# Patient Record
Sex: FEMALE | Race: WHITE | NOT HISPANIC OR LATINO | Employment: UNEMPLOYED | URBAN - NONMETROPOLITAN AREA
[De-identification: names, ages, dates, MRNs, and addresses within clinical notes are randomized per-mention and may not be internally consistent; named-entity substitution may affect disease eponyms.]

---

## 2017-01-20 ENCOUNTER — ROUTINE PRENATAL (OUTPATIENT)
Dept: OBSTETRICS AND GYNECOLOGY | Facility: CLINIC | Age: 32
End: 2017-01-20

## 2017-01-20 ENCOUNTER — HOSPITAL ENCOUNTER (OUTPATIENT)
Dept: OTHER | Facility: HOSPITAL | Age: 32
Discharge: HOME | End: 2017-01-20
Attending: NURSE PRACTITIONER | Admitting: NURSE PRACTITIONER

## 2017-01-20 VITALS — DIASTOLIC BLOOD PRESSURE: 73 MMHG | BODY MASS INDEX: 34.02 KG/M2 | WEIGHT: 214 LBS | SYSTOLIC BLOOD PRESSURE: 110 MMHG

## 2017-01-20 DIAGNOSIS — O26.891 RH NEGATIVE STATUS DURING PREGNANCY IN FIRST TRIMESTER, ANTEPARTUM: ICD-10-CM

## 2017-01-20 DIAGNOSIS — Z3A.11 11 WEEKS GESTATION OF PREGNANCY: Primary | ICD-10-CM

## 2017-01-20 DIAGNOSIS — Z67.91 RH NEGATIVE STATUS DURING PREGNANCY IN FIRST TRIMESTER, ANTEPARTUM: ICD-10-CM

## 2017-01-20 DIAGNOSIS — Z34.81 PRENATAL CARE, SUBSEQUENT PREGNANCY, FIRST TRIMESTER: ICD-10-CM

## 2017-01-20 PROCEDURE — 0502F SUBSEQUENT PRENATAL CARE: CPT | Performed by: ADVANCED PRACTICE MIDWIFE

## 2017-01-20 RX ORDER — FERROUS SULFATE TAB EC 324 MG (65 MG FE EQUIVALENT) 324 (65 FE) MG
324 TABLET DELAYED RESPONSE ORAL
COMMUNITY
End: 2017-05-26

## 2017-01-20 RX ORDER — METRONIDAZOLE 500 MG/1
500 TABLET ORAL 2 TIMES DAILY
Qty: 14 TABLET | Refills: 0 | Status: SHIPPED | OUTPATIENT
Start: 2017-01-20 | End: 2017-05-26

## 2017-01-20 NOTE — MR AVS SNAPSHOT
Landen Guzmán   1/20/2017 11:30 AM   Routine Prenatal    Dept Phone:  [phone]   Encounter #:  [id number]    Provider:  CARMEN العراقي   Department:  [facility]                Your Full Care Plan              Today's Medication Changes          These changes are accurate as of: 1/20/17 11:49 AM.  If you have any questions, ask your nurse or doctor.               New Medication(s)Ordered:     metroNIDAZOLE 500 MG tablet   Commonly known as:  FLAGYL   Take 1 tablet by mouth 2 (Two) Times a Day.         Stop taking medication(s)listed here:     ondansetron 4 MG tablet   Commonly known as:  ZOFRAN                Where to Get Your Medications      These medications were sent to [facility] [id number] [place] [address] - [address] [facility] AT [facility] [address] [place] - [phone]  - [phone]   [address] [facility] [address]     Phone:  [phone]     metroNIDAZOLE 500 MG tablet                  Your Updated Medication List          This list is accurate as of: 1/20/17 11:49 AM.  Always use your most recent med list.                ferrous sulfate 324 (65 FE) MG tablet delayed-release EC tablet       metroNIDAZOLE 500 MG tablet   Commonly known as:  FLAGYL   Take 1 tablet by mouth 2 (Two) Times a Day.       PRENATAL VITAMIN PO               You Were Diagnosed With        Codes Comments    11 weeks gestation of pregnancy    -  Primary ICD-10-CM: Z3A.11  ICD-9-CM: V22.2     Rh negative status during pregnancy in first trimester, antepartum     ICD-10-CM: O09.891  ICD-9-CM: V23.89     Prenatal care, subsequent pregnancy, first trimester     ICD-10-CM: Z34.81  ICD-9-CM: V22.1       Instructions     None    Patient Instructions History      Upcoming Appointments     Visit Type Date Time Department    OB FOLLOWUP 1/20/2017 11:30 AM JUMANA BRASHER    OB FOLLOWUP 2/15/2017 11:00 AM JUMANA Forman Signup     [facility] allows  you to send messages to your doctor, view your test results, renew your prescriptions, schedule appointments, and more. To sign up, go to [facility] and click on the Sign Up Now link in the New User? box. Enter your [email or web address] Activation Code exactly as it appears below along with the last four digits of your Social Security Number and your Date of Birth () to complete the sign-up process. If you do not sign up before the expiration date, you must request a new code.    [email or web address] Activation Code: [email or web address]  Expires: 2/3/2017 11:49 AM    If you have questions, you can email [email or web address] or call [phone] to talk to our [email or web address] staff. Remember, [email or web address] is NOT to be used for urgent needs. For medical emergencies, dial 911.               Other Info from Your Visit           Your Appointments     Feb 15, 2017 11:00 AM CST   OB FOLLOWUP with CARMEN العراقي   [facility] (--)    [address] [place] [address] [address]  [facility] [address] [place] [id number] [phone]              Other Notes About Your Plan     RH negative- Rhogam at 28 weeks  BV- rx sent for flagyl needs to start it on         Allergies     Nickel        Reason for Visit     Routine Prenatal Visit dating ultrasound today       Vital Signs     Blood Pressure Weight Last Menstrual Period Body Mass Index Smoking Status       110/73 214 lb (97.1 kg) 2016 (Approximate) 34.02 kg/m2 Never Smoker       Problems and Diagnoses Noted     Rh negative status during pregnancy in first trimester, antepartum    11 weeks gestation of pregnancy    -  Primary    Prenatal care

## 2017-01-20 NOTE — PROGRESS NOTES
WILLIS appt.    ROS: -N/V,-HA,-SOb. RX sent for flagyl, patient educated on starting it 1/27/17 for BV. Reviewed US and labs   Hx reviewed see HX tabs

## 2017-02-20 ENCOUNTER — ROUTINE PRENATAL (OUTPATIENT)
Dept: OBSTETRICS AND GYNECOLOGY | Facility: CLINIC | Age: 32
End: 2017-02-20

## 2017-02-20 VITALS — DIASTOLIC BLOOD PRESSURE: 67 MMHG | WEIGHT: 217 LBS | SYSTOLIC BLOOD PRESSURE: 102 MMHG | BODY MASS INDEX: 34.5 KG/M2

## 2017-02-20 DIAGNOSIS — Z13.89 ENCOUNTER FOR ROUTINE SCREENING FOR MALFORMATION USING ULTRASONICS: ICD-10-CM

## 2017-02-20 DIAGNOSIS — Z34.82 ENCOUNTER FOR SUPERVISION OF OTHER NORMAL PREGNANCY IN SECOND TRIMESTER: Primary | ICD-10-CM

## 2017-02-20 DIAGNOSIS — Z3A.15 15 WEEKS GESTATION OF PREGNANCY: ICD-10-CM

## 2017-02-20 PROBLEM — Z34.92 ENCOUNTER FOR SUPERVISION OF NORMAL PREGNANCY IN SECOND TRIMESTER: Status: ACTIVE | Noted: 2017-02-20

## 2017-02-20 PROCEDURE — 0502F SUBSEQUENT PRENATAL CARE: CPT | Performed by: OBSTETRICS & GYNECOLOGY

## 2017-02-20 NOTE — PROGRESS NOTES
CC: WILLIS visit    HPI:  No complaints or concerns.  Went to a independent sonogram place, found out that she is having a boy.  No vb.  No cramping.  Has not felt movement yet.     PE - see vitals  BSS - active fetus,     A/P: 30 yo  @ 15w4d by LMP c/w 11w sono presents for WILLIS visit.   1. Continue routine prenatal care  - continue PNV  - PTL precautions  - Rh negative, will need Rhogam at 28 weejs  - declined flu vaccine and quad screen today  - RTC in 3 weeks, will do anatomy scan at that time.     Becky MORALES Friday

## 2017-03-13 ENCOUNTER — ROUTINE PRENATAL (OUTPATIENT)
Dept: OBSTETRICS AND GYNECOLOGY | Facility: CLINIC | Age: 32
End: 2017-03-13

## 2017-03-13 VITALS — BODY MASS INDEX: 34.5 KG/M2 | SYSTOLIC BLOOD PRESSURE: 108 MMHG | WEIGHT: 217 LBS | DIASTOLIC BLOOD PRESSURE: 72 MMHG

## 2017-03-13 DIAGNOSIS — Z67.91 RH NEGATIVE STATUS DURING PREGNANCY IN FIRST TRIMESTER, ANTEPARTUM: ICD-10-CM

## 2017-03-13 DIAGNOSIS — O26.891 RH NEGATIVE STATUS DURING PREGNANCY IN FIRST TRIMESTER, ANTEPARTUM: ICD-10-CM

## 2017-03-13 DIAGNOSIS — Z3A.18 18 WEEKS GESTATION OF PREGNANCY: ICD-10-CM

## 2017-03-13 DIAGNOSIS — Z34.82 ENCOUNTER FOR SUPERVISION OF OTHER NORMAL PREGNANCY IN SECOND TRIMESTER: Primary | ICD-10-CM

## 2017-03-13 PROCEDURE — 0502F SUBSEQUENT PRENATAL CARE: CPT | Performed by: OBSTETRICS & GYNECOLOGY

## 2017-03-13 NOTE — PROGRESS NOTES
CC: WILLIS visit    HPI:  No complaints or concerns.  Nausea completely resolved.  No vb.  No cramping. Starting to feel movements.     PE - see vitals  Anatomy scan - normal male anatomy, EFW 247g, cephalic, posterior placenta, ELLY 11.2; CL 3.16    A/P: 30 yo  @ 18w4d by LMP c/w 11w sujatha presents for WILLIS visit.   1. Continue routine prenatal care  - Continue PNV  - PTL precautions  - Rh negative, will need Rhogam at 28 weejs  - again, declined flu vaccine and quad screen today  - RTC in 4 weeks     Becky P. Friday

## 2017-04-14 ENCOUNTER — ROUTINE PRENATAL (OUTPATIENT)
Dept: OBSTETRICS AND GYNECOLOGY | Facility: CLINIC | Age: 32
End: 2017-04-14

## 2017-04-14 VITALS — SYSTOLIC BLOOD PRESSURE: 112 MMHG | WEIGHT: 217 LBS | DIASTOLIC BLOOD PRESSURE: 63 MMHG | BODY MASS INDEX: 34.5 KG/M2

## 2017-04-14 DIAGNOSIS — Z67.91 RH NEGATIVE STATUS DURING PREGNANCY IN FIRST TRIMESTER, ANTEPARTUM: ICD-10-CM

## 2017-04-14 DIAGNOSIS — O26.891 RH NEGATIVE STATUS DURING PREGNANCY IN FIRST TRIMESTER, ANTEPARTUM: ICD-10-CM

## 2017-04-14 DIAGNOSIS — Z3A.23 23 WEEKS GESTATION OF PREGNANCY: ICD-10-CM

## 2017-04-14 DIAGNOSIS — Z34.82 ENCOUNTER FOR SUPERVISION OF OTHER NORMAL PREGNANCY IN SECOND TRIMESTER: Primary | ICD-10-CM

## 2017-04-14 PROCEDURE — 0502F SUBSEQUENT PRENATAL CARE: CPT | Performed by: OBSTETRICS & GYNECOLOGY

## 2017-05-01 ENCOUNTER — TELEPHONE (OUTPATIENT)
Dept: OBSTETRICS AND GYNECOLOGY | Facility: CLINIC | Age: 32
End: 2017-05-01

## 2017-05-26 ENCOUNTER — LAB (OUTPATIENT)
Dept: LAB | Facility: HOSPITAL | Age: 32
End: 2017-05-26

## 2017-05-26 ENCOUNTER — ROUTINE PRENATAL (OUTPATIENT)
Dept: OBSTETRICS AND GYNECOLOGY | Facility: CLINIC | Age: 32
End: 2017-05-26

## 2017-05-26 VITALS — WEIGHT: 226 LBS | BODY MASS INDEX: 35.93 KG/M2 | DIASTOLIC BLOOD PRESSURE: 62 MMHG | SYSTOLIC BLOOD PRESSURE: 89 MMHG

## 2017-05-26 DIAGNOSIS — Z67.91 RH NEGATIVE STATUS DURING PREGNANCY IN FIRST TRIMESTER, ANTEPARTUM: ICD-10-CM

## 2017-05-26 DIAGNOSIS — Z3A.29 29 WEEKS GESTATION OF PREGNANCY: ICD-10-CM

## 2017-05-26 DIAGNOSIS — O26.891 RH NEGATIVE STATUS DURING PREGNANCY IN FIRST TRIMESTER, ANTEPARTUM: ICD-10-CM

## 2017-05-26 DIAGNOSIS — Z34.83 ENCOUNTER FOR SUPERVISION OF OTHER NORMAL PREGNANCY IN THIRD TRIMESTER: Primary | ICD-10-CM

## 2017-05-26 LAB
BASOPHILS # BLD AUTO: 0.02 10*3/MM3 (ref 0–0.2)
BASOPHILS NFR BLD AUTO: 0.2 % (ref 0–2)
DEPRECATED RDW RBC AUTO: 42.4 FL (ref 36.4–46.3)
EOSINOPHIL # BLD AUTO: 0.05 10*3/MM3 (ref 0–0.7)
EOSINOPHIL NFR BLD AUTO: 0.6 % (ref 0–7)
ERYTHROCYTE [DISTWIDTH] IN BLOOD BY AUTOMATED COUNT: 13.5 % (ref 11.5–14.5)
GLUCOSE 1H P 100 G GLC PO SERPL-MCNC: 105 MG/DL (ref 60–140)
HCT VFR BLD AUTO: 32 % (ref 35–45)
HGB BLD-MCNC: 10.7 G/DL (ref 12–15.5)
IMM GRANULOCYTES # BLD: 0.06 10*3/MM3 (ref 0–0.02)
IMM GRANULOCYTES NFR BLD: 0.7 % (ref 0–0.5)
LYMPHOCYTES # BLD AUTO: 2.07 10*3/MM3 (ref 0.6–4.2)
LYMPHOCYTES NFR BLD AUTO: 24.3 % (ref 10–50)
MCH RBC QN AUTO: 29.1 PG (ref 26.5–34)
MCHC RBC AUTO-ENTMCNC: 33.4 G/DL (ref 31.4–36)
MCV RBC AUTO: 87 FL (ref 80–98)
MONOCYTES # BLD AUTO: 0.67 10*3/MM3 (ref 0–0.9)
MONOCYTES NFR BLD AUTO: 7.9 % (ref 0–12)
NEUTROPHILS # BLD AUTO: 5.64 10*3/MM3 (ref 2–8.6)
NEUTROPHILS NFR BLD AUTO: 66.3 % (ref 37–80)
PLATELET # BLD AUTO: 190 10*3/MM3 (ref 150–450)
PMV BLD AUTO: 11.2 FL (ref 8–12)
RBC # BLD AUTO: 3.68 10*6/MM3 (ref 3.77–5.16)
WBC NRBC COR # BLD: 8.51 10*3/MM3 (ref 3.2–9.8)

## 2017-05-26 PROCEDURE — 96372 THER/PROPH/DIAG INJ SC/IM: CPT | Performed by: OBSTETRICS & GYNECOLOGY

## 2017-05-26 PROCEDURE — 0502F SUBSEQUENT PRENATAL CARE: CPT | Performed by: OBSTETRICS & GYNECOLOGY

## 2017-05-26 PROCEDURE — 82950 GLUCOSE TEST: CPT | Performed by: OBSTETRICS & GYNECOLOGY

## 2017-05-26 PROCEDURE — 85025 COMPLETE CBC W/AUTO DIFF WBC: CPT | Performed by: OBSTETRICS & GYNECOLOGY

## 2017-05-26 PROCEDURE — 36415 COLL VENOUS BLD VENIPUNCTURE: CPT | Performed by: OBSTETRICS & GYNECOLOGY

## 2017-06-13 ENCOUNTER — ROUTINE PRENATAL (OUTPATIENT)
Dept: OBSTETRICS AND GYNECOLOGY | Facility: CLINIC | Age: 32
End: 2017-06-13

## 2017-06-13 VITALS — SYSTOLIC BLOOD PRESSURE: 98 MMHG | WEIGHT: 229 LBS | BODY MASS INDEX: 36.41 KG/M2 | DIASTOLIC BLOOD PRESSURE: 59 MMHG

## 2017-06-13 DIAGNOSIS — Z67.91 RH NEGATIVE STATUS DURING PREGNANCY IN FIRST TRIMESTER, ANTEPARTUM: ICD-10-CM

## 2017-06-13 DIAGNOSIS — Z3A.31 31 WEEKS GESTATION OF PREGNANCY: ICD-10-CM

## 2017-06-13 DIAGNOSIS — O26.891 RH NEGATIVE STATUS DURING PREGNANCY IN FIRST TRIMESTER, ANTEPARTUM: ICD-10-CM

## 2017-06-13 DIAGNOSIS — Z34.83 ENCOUNTER FOR SUPERVISION OF OTHER NORMAL PREGNANCY IN THIRD TRIMESTER: Primary | ICD-10-CM

## 2017-06-13 PROCEDURE — 0502F SUBSEQUENT PRENATAL CARE: CPT | Performed by: OBSTETRICS & GYNECOLOGY

## 2017-06-13 NOTE — PROGRESS NOTES
CC: WILLIS visit    HPI: Doing well.  No LOF or vb.  +FM.  No cramping or ctx.      PE - see vitals    A/P: 30 yo  @ 31w5d by LMP c/w 11w sujatha presents for WILLIS visit.   1. Continue routine prenatal care  - Continue PNV   - PTL precautions  - Rh negative, s/p Rhogam on ; 1hr wnl  - Declined flu vaccine and quad screen  - RTC in 2 weeks      Becky Schwartz

## 2017-06-19 ENCOUNTER — APPOINTMENT (OUTPATIENT)
Dept: GENERAL RADIOLOGY | Facility: HOSPITAL | Age: 32
End: 2017-06-19

## 2017-06-19 ENCOUNTER — APPOINTMENT (OUTPATIENT)
Dept: ULTRASOUND IMAGING | Facility: HOSPITAL | Age: 32
End: 2017-06-19

## 2017-06-19 ENCOUNTER — HOSPITAL ENCOUNTER (EMERGENCY)
Facility: HOSPITAL | Age: 32
Discharge: HOME | End: 2017-06-19
Attending: EMERGENCY MEDICINE | Admitting: EMERGENCY MEDICINE

## 2017-06-19 VITALS
HEIGHT: 66 IN | OXYGEN SATURATION: 95 % | DIASTOLIC BLOOD PRESSURE: 60 MMHG | TEMPERATURE: 97.8 F | RESPIRATION RATE: 18 BRPM | HEART RATE: 90 BPM | SYSTOLIC BLOOD PRESSURE: 116 MMHG

## 2017-06-19 DIAGNOSIS — E87.6 HYPOKALEMIA: ICD-10-CM

## 2017-06-19 DIAGNOSIS — F45.8 HYPERVENTILATION SYNDROME: ICD-10-CM

## 2017-06-19 DIAGNOSIS — R06.00 DYSPNEA, UNSPECIFIED TYPE: Primary | ICD-10-CM

## 2017-06-19 LAB
ALBUMIN SERPL-MCNC: 3.6 G/DL (ref 3.4–4.8)
ALBUMIN/GLOB SERPL: 1.1 G/DL (ref 1.1–1.8)
ALP SERPL-CCNC: 112 U/L (ref 38–126)
ALT SERPL W P-5'-P-CCNC: 19 U/L (ref 9–52)
ANION GAP SERPL CALCULATED.3IONS-SCNC: 10 MMOL/L (ref 5–15)
AST SERPL-CCNC: 17 U/L (ref 14–36)
BASOPHILS # BLD AUTO: 0.01 10*3/MM3 (ref 0–0.2)
BASOPHILS NFR BLD AUTO: 0.1 % (ref 0–2)
BILIRUB SERPL-MCNC: 0.4 MG/DL (ref 0.2–1.3)
BILIRUB UR QL STRIP: NEGATIVE
BUN BLD-MCNC: 5 MG/DL (ref 7–21)
BUN/CREAT SERPL: 10 (ref 7–25)
CALCIUM SPEC-SCNC: 8.7 MG/DL (ref 8.4–10.2)
CHLORIDE SERPL-SCNC: 104 MMOL/L (ref 95–110)
CLARITY UR: CLEAR
CLUMPED PLATELETS: NORMAL
CO2 SERPL-SCNC: 23 MMOL/L (ref 22–31)
COLOR UR: YELLOW
CREAT BLD-MCNC: 0.5 MG/DL (ref 0.5–1)
D-DIMER, QUANTITATIVE (MAD,POW, STR): 502 NG/ML (FEU) (ref 0–470)
DEPRECATED RDW RBC AUTO: 42.7 FL (ref 36.4–46.3)
EOSINOPHIL # BLD AUTO: 0.07 10*3/MM3 (ref 0–0.7)
EOSINOPHIL NFR BLD AUTO: 0.7 % (ref 0–7)
ERYTHROCYTE [DISTWIDTH] IN BLOOD BY AUTOMATED COUNT: 14 % (ref 11.5–14.5)
GFR SERPL CREATININE-BSD FRML MDRD: 146 ML/MIN/1.73 (ref 60–165)
GLOBULIN UR ELPH-MCNC: 3.3 GM/DL (ref 2.3–3.5)
GLUCOSE BLD-MCNC: 98 MG/DL (ref 60–100)
GLUCOSE UR STRIP-MCNC: ABNORMAL MG/DL
HCT VFR BLD AUTO: 34.5 % (ref 35–45)
HGB BLD-MCNC: 11.5 G/DL (ref 12–15.5)
HGB UR QL STRIP.AUTO: NEGATIVE
IMM GRANULOCYTES # BLD: 0.07 10*3/MM3 (ref 0–0.02)
IMM GRANULOCYTES NFR BLD: 0.7 % (ref 0–0.5)
KETONES UR QL STRIP: NEGATIVE
LEUKOCYTE ESTERASE UR QL STRIP.AUTO: NEGATIVE
LYMPHOCYTES # BLD AUTO: 2.31 10*3/MM3 (ref 0.6–4.2)
LYMPHOCYTES NFR BLD AUTO: 21.7 % (ref 10–50)
MCH RBC QN AUTO: 28.3 PG (ref 26.5–34)
MCHC RBC AUTO-ENTMCNC: 33.3 G/DL (ref 31.4–36)
MCV RBC AUTO: 85 FL (ref 80–98)
MONOCYTES # BLD AUTO: 0.74 10*3/MM3 (ref 0–0.9)
MONOCYTES NFR BLD AUTO: 6.9 % (ref 0–12)
NEUTROPHILS # BLD AUTO: 7.46 10*3/MM3 (ref 2–8.6)
NEUTROPHILS NFR BLD AUTO: 69.9 % (ref 37–80)
NITRITE UR QL STRIP: NEGATIVE
NT-PROBNP SERPL-MCNC: 35.5 PG/ML (ref 0–450)
PH UR STRIP.AUTO: 6.5 [PH] (ref 5–9)
PLATELET # BLD AUTO: 174 10*3/MM3 (ref 150–450)
PMV BLD AUTO: 11.3 FL (ref 8–12)
POTASSIUM BLD-SCNC: 3.1 MMOL/L (ref 3.5–5.1)
PROT SERPL-MCNC: 6.9 G/DL (ref 6.3–8.6)
PROT UR QL STRIP: NEGATIVE
RBC # BLD AUTO: 4.06 10*6/MM3 (ref 3.77–5.16)
RBC MORPH BLD: NORMAL
SMALL PLATELETS BLD QL SMEAR: ADEQUATE
SODIUM BLD-SCNC: 137 MMOL/L (ref 137–145)
SP GR UR STRIP: 1.01 (ref 1–1.03)
UROBILINOGEN UR QL STRIP: ABNORMAL
WBC MORPH BLD: NORMAL
WBC NRBC COR # BLD: 10.66 10*3/MM3 (ref 3.2–9.8)

## 2017-06-19 PROCEDURE — 81003 URINALYSIS AUTO W/O SCOPE: CPT | Performed by: EMERGENCY MEDICINE

## 2017-06-19 PROCEDURE — 83880 ASSAY OF NATRIURETIC PEPTIDE: CPT | Performed by: EMERGENCY MEDICINE

## 2017-06-19 PROCEDURE — 93970 EXTREMITY STUDY: CPT

## 2017-06-19 PROCEDURE — 93010 ELECTROCARDIOGRAM REPORT: CPT | Performed by: INTERNAL MEDICINE

## 2017-06-19 PROCEDURE — 80053 COMPREHEN METABOLIC PANEL: CPT | Performed by: EMERGENCY MEDICINE

## 2017-06-19 PROCEDURE — 96360 HYDRATION IV INFUSION INIT: CPT

## 2017-06-19 PROCEDURE — 85007 BL SMEAR W/DIFF WBC COUNT: CPT | Performed by: EMERGENCY MEDICINE

## 2017-06-19 PROCEDURE — 71020 HC CHEST PA AND LATERAL: CPT

## 2017-06-19 PROCEDURE — 99283 EMERGENCY DEPT VISIT LOW MDM: CPT

## 2017-06-19 PROCEDURE — 85379 FIBRIN DEGRADATION QUANT: CPT | Performed by: EMERGENCY MEDICINE

## 2017-06-19 PROCEDURE — 93005 ELECTROCARDIOGRAM TRACING: CPT | Performed by: EMERGENCY MEDICINE

## 2017-06-19 PROCEDURE — 85025 COMPLETE CBC W/AUTO DIFF WBC: CPT | Performed by: EMERGENCY MEDICINE

## 2017-06-19 RX ORDER — SODIUM CHLORIDE 0.9 % (FLUSH) 0.9 %
10 SYRINGE (ML) INJECTION AS NEEDED
Status: DISCONTINUED | OUTPATIENT
Start: 2017-06-19 | End: 2017-06-19 | Stop reason: HOSPADM

## 2017-06-19 RX ORDER — POTASSIUM CHLORIDE 750 MG/1
40 CAPSULE, EXTENDED RELEASE ORAL ONCE
Status: COMPLETED | OUTPATIENT
Start: 2017-06-19 | End: 2017-06-19

## 2017-06-19 RX ADMIN — SODIUM CHLORIDE 500 ML: 9 INJECTION, SOLUTION INTRAVENOUS at 18:05

## 2017-06-19 RX ADMIN — POTASSIUM CHLORIDE 40 MEQ: 750 CAPSULE, EXTENDED RELEASE ORAL at 19:05

## 2017-06-19 NOTE — DISCHARGE INSTRUCTIONS
Followup with  Friday as needed if not improving this week.  Return with any new or worsening symptoms, or any concerns.

## 2017-06-19 NOTE — ED PROVIDER NOTES
Subjective   HPI Comments: Patient presents with dyspnea today.  Patient notes acute onset of shortness of breath this afternoon.  First episode lasting over an hour with gradual improvement, then recurrent episode after she went home this afternoon.  Patient feels like she was hit in the throat and is unable to catch her breath.  Patient has not had similar episodes in the psat.  No f/c.  [facility]  Has had no blood pressure issues.        History provided by:  Patient   used: No        Review of Systems   Constitutional: Negative.  Negative for appetite change, chills and fever.   HENT: Negative.  Negative for congestion.    Eyes: Negative.  Negative for photophobia and visual disturbance.   Respiratory: Positive for shortness of breath. Negative for cough and chest tightness.    Cardiovascular: Negative for chest pain, palpitations and leg swelling.   Gastrointestinal: Negative.  Negative for abdominal pain, constipation, diarrhea, nausea and vomiting.   Endocrine: Negative.    Genitourinary: Negative.  Negative for decreased urine volume, dysuria, flank pain and hematuria.   Musculoskeletal: Negative.  Negative for arthralgias, back pain, myalgias, neck pain and neck stiffness.   Skin: Negative.  Negative for pallor.   Neurological: Positive for dizziness and light-headedness. Negative for syncope, weakness, numbness and headaches.   Psychiatric/Behavioral: Negative.  Negative for confusion and suicidal ideas. The patient is not nervous/anxious.    All other systems reviewed and are negative.      Past Medical History:   Diagnosis Date   • Dysmenorrhea    • Encounter for gynecological examination    • Encounter for  visit    • Low back pain    • Primigravida    • Viral upper respiratory tract infection        Allergies   Allergen Reactions   • Nickel        Past Surgical History:   Procedure Laterality Date   • COLONOSCOPY  2011    normal  colonoscopy   • PAP SMEAR  10/27/2010    negative       Family History   Problem Relation Age of Onset   • Endometriosis Mother    • Hypertension Father    • Other Father      poor kidney function   • Colon cancer Maternal Grandfather    • Breast cancer Paternal Grandmother        Social History     Social History   • Marital status:      Spouse name: N/A   • Number of children: N/A   • Years of education: N/A     Social History Main Topics   • Smoking status: Never Smoker   • Smokeless tobacco: None   • Alcohol use No   • Drug use: No   • Sexual activity: Not Asked     Other Topics Concern   • None     Social History Narrative           Objective   Physical Exam   Constitutional: She is oriented to person, place, and time. She appears well-developed and well-nourished. She appears distressed (anxious).   HENT:   Head: Normocephalic and atraumatic.   Nose: Nose normal.   Mouth/Throat: Oropharynx is clear and moist.   Eyes: Conjunctivae and EOM are normal. No scleral icterus.   Neck: Normal range of motion. Neck supple. No JVD present.   Cardiovascular: Normal rate, regular rhythm, normal heart sounds and intact distal pulses.  Exam reveals no gallop and no friction rub.    No murmur heard.  Pulmonary/Chest: Effort normal. No respiratory distress. She has no wheezes. She has no rales. She exhibits no tenderness.   Mild tachypnea, though good air movement   Abdominal: Soft. She exhibits no distension and no mass. There is no tenderness. There is no rebound and no guarding.   Gravid uterus, to the xiphoid region.  Positive fetal motion.     Musculoskeletal: Normal range of motion. She exhibits no edema, tenderness or deformity.   Lymphadenopathy:     She has no cervical adenopathy.   Neurological: She is alert and oriented to person, place, and time. No cranial nerve deficit. She exhibits normal muscle tone.   Skin: Skin is warm and dry. No rash noted. She is not diaphoretic. No erythema. No pallor.    Psychiatric: Her behavior is normal. Judgment and thought content normal.   anxious   Nursing note and vitals reviewed.      Procedures         ED Course  ED Course      Labs Reviewed   COMPREHENSIVE METABOLIC PANEL - Abnormal; Notable for the following:        Result Value    BUN 5 (*)     Potassium 3.1 (*)     All other components within normal limits   URINALYSIS W/ CULTURE IF INDICATED - Abnormal; Notable for the following:     Glucose,  mg/dL (2+) (*)     All other components within normal limits    Narrative:     Urine microscopic not indicated.   D-DIMER, QUANTITATIVE - Abnormal; Notable for the following:     D-Dimer, Quantitative 502 (*)     All other components within normal limits    Narrative:     Dimer values <500 ng/ml FEU are FDA approved as aid in diagnosis of deep venous thrombosis and pulmonary embolism.  This test should not be used in an exclusion strategy with pretest probability alone.    A recent guideline regarding diagnosis for pulmonary thomboembolism recommends an adjusted exclusion criterion of age x 10 ng/ml FEU for patients >50 years of age ([place] Intern Med 2015; 163: 701-711).   CBC WITH AUTO DIFFERENTIAL - Abnormal; Notable for the following:     WBC 10.66 (*)     Hemoglobin 11.5 (*)     Hematocrit 34.5 (*)     Immature Grans % 0.7 (*)     Immature Grans, Absolute 0.07 (*)     All other components within normal limits   BNP (IN-HOUSE) - Normal   SCAN SLIDE   CBC AND DIFFERENTIAL    Narrative:     The following orders were created for panel order CBC & Differential.  Procedure                               Abnormality         Status                     ---------                               -----------         ------                     Scan Slide[[id number]]                                        In process                 CBC Auto Differential[[id number]]         Abnormal            Final result                 Please view results for these tests on the individual orders.        XR Chest 2 View   Final Result   CONCLUSION:     No acute cardiopulmonary disease      Electronically signed by:  Ghanshyam Jimenez MD  6/19/2017 5:42 PM CDT   Workstation: [id number]      US Venous Doppler Lower Extremity Bilateral (duplex)   Final Result   CONCLUSION:    No DVT      Electronically signed by:  Ghanshyam Jimenez MD  6/19/2017 5:36 PM CDT   Workstation: [id number]        No DVT, no significant dimer elevation in pregnancy, normal VS.  Low suspicion DVT/PE.  Symptoms resolved.  Mild hypokalemia, will replace with diet.  Followup OB for further management.                MDM    Final diagnoses:   Dyspnea, unspecified type   Hyperventilation syndrome   Hypokalemia            Bryan Schafer MD  06/19/17 1879       Bryan Schafer MD  06/19/17 1901

## 2017-07-07 ENCOUNTER — ROUTINE PRENATAL (OUTPATIENT)
Dept: OBSTETRICS AND GYNECOLOGY | Facility: CLINIC | Age: 32
End: 2017-07-07

## 2017-07-07 VITALS — SYSTOLIC BLOOD PRESSURE: 102 MMHG | WEIGHT: 235 LBS | BODY MASS INDEX: 37.93 KG/M2 | DIASTOLIC BLOOD PRESSURE: 56 MMHG

## 2017-07-07 DIAGNOSIS — Z34.83 ENCOUNTER FOR SUPERVISION OF OTHER NORMAL PREGNANCY IN THIRD TRIMESTER: Primary | ICD-10-CM

## 2017-07-07 DIAGNOSIS — Z3A.35 35 WEEKS GESTATION OF PREGNANCY: ICD-10-CM

## 2017-07-07 PROBLEM — Z34.93 ENCOUNTER FOR SUPERVISION OF NORMAL PREGNANCY IN THIRD TRIMESTER: Status: ACTIVE | Noted: 2017-02-20

## 2017-07-07 PROCEDURE — 0502F SUBSEQUENT PRENATAL CARE: CPT | Performed by: OBSTETRICS & GYNECOLOGY

## 2017-07-07 NOTE — PROGRESS NOTES
CC: WILLIS visit    HPI:  Was seen in the ER a couple of weeks ago with complaints of SOB.  Work up negative.  No episodes of SOB since that episodes, however, does feel like she has to take deep breaths at times because her belly presses up on her lungs.  No LOF or vb.  No cramping or ctx.  +FM    PE - see vitals     A/P: 32 yo  @ 35w1d by LMP c/w 11w sujatha presents for WILLIS visit.   1. Continue routine prenatal care  - Continue PNV   - PTL precautions  - Rh negative, s/p Rhogam on ; 1hr wnl  - Declined flu vaccine and quad screen  - RTC in 1 week, will do GBS at that time.       Becky MORALES Friday

## 2017-07-17 ENCOUNTER — ROUTINE PRENATAL (OUTPATIENT)
Dept: OBSTETRICS AND GYNECOLOGY | Facility: CLINIC | Age: 32
End: 2017-07-17

## 2017-07-17 VITALS — BODY MASS INDEX: 38.9 KG/M2 | DIASTOLIC BLOOD PRESSURE: 60 MMHG | WEIGHT: 241 LBS | SYSTOLIC BLOOD PRESSURE: 108 MMHG

## 2017-07-17 DIAGNOSIS — Z34.03 ENCOUNTER FOR SUPERVISION OF NORMAL FIRST PREGNANCY IN THIRD TRIMESTER: Primary | ICD-10-CM

## 2017-07-17 DIAGNOSIS — Z3A.36 36 WEEKS GESTATION OF PREGNANCY: ICD-10-CM

## 2017-07-17 PROCEDURE — 87653 STREP B DNA AMP PROBE: CPT | Performed by: OBSTETRICS & GYNECOLOGY

## 2017-07-17 PROCEDURE — 0502F SUBSEQUENT PRENATAL CARE: CPT | Performed by: OBSTETRICS & GYNECOLOGY

## 2017-07-17 NOTE — PROGRESS NOTES
CC: WILLIS visit    HPI:  Doing well.  No more issues with SOB.  No LOF or vb.  +FM.  Occasional BH, but nothing too painful or regular.     PE - see vitals    A/P: 30 yo  @ 36w4d by LMP c/w 11w sujatha presents for WILLIS visit.   1. Continue routine prenatal care  - Continue PNV   - PTL precautions  - Rh negative, s/p Rhogam on ; 1hr wnl; GBS today   - Declined flu vaccine and quad screen  - RTC in 1 week       Becky MORALES Friday

## 2017-07-18 LAB — GROUP B STREP, DNA: NEGATIVE

## 2017-07-24 ENCOUNTER — ROUTINE PRENATAL (OUTPATIENT)
Dept: OBSTETRICS AND GYNECOLOGY | Facility: CLINIC | Age: 32
End: 2017-07-24

## 2017-07-24 VITALS — DIASTOLIC BLOOD PRESSURE: 69 MMHG | SYSTOLIC BLOOD PRESSURE: 112 MMHG | WEIGHT: 240 LBS | BODY MASS INDEX: 38.74 KG/M2

## 2017-07-24 DIAGNOSIS — Z34.83 ENCOUNTER FOR SUPERVISION OF OTHER NORMAL PREGNANCY IN THIRD TRIMESTER: ICD-10-CM

## 2017-07-24 DIAGNOSIS — Z3A.37 37 WEEKS GESTATION OF PREGNANCY: Primary | ICD-10-CM

## 2017-07-24 PROCEDURE — 0502F SUBSEQUENT PRENATAL CARE: CPT | Performed by: OBSTETRICS & GYNECOLOGY

## 2017-07-24 NOTE — PROGRESS NOTES
CC: WILLIS visit    HPI:  Doing well.  +FM.  No LOF or vb.  No cramping or ctx.      PE - see vitals    A/P: 30 yo  @ 37w4d by LMP c/w 11w sujatha presents for WILLIS visit.   1. Continue routine prenatal care  - Continue PNV   - Term labor precautions  - Rh negative, s/p Rhogam on ; 1hr wnl; GBS negative  - Declined flu vaccine and quad screen  - RTC in 1 week       Becky CARMEN Friday

## 2017-08-02 ENCOUNTER — ROUTINE PRENATAL (OUTPATIENT)
Dept: OBSTETRICS AND GYNECOLOGY | Facility: CLINIC | Age: 32
End: 2017-08-02

## 2017-08-02 VITALS — WEIGHT: 245 LBS | SYSTOLIC BLOOD PRESSURE: 121 MMHG | BODY MASS INDEX: 39.54 KG/M2 | DIASTOLIC BLOOD PRESSURE: 74 MMHG

## 2017-08-02 DIAGNOSIS — Z3A.38 38 WEEKS GESTATION OF PREGNANCY: ICD-10-CM

## 2017-08-02 DIAGNOSIS — Z34.83 ENCOUNTER FOR SUPERVISION OF OTHER NORMAL PREGNANCY IN THIRD TRIMESTER: Primary | ICD-10-CM

## 2017-08-02 PROCEDURE — 0502F SUBSEQUENT PRENATAL CARE: CPT | Performed by: OBSTETRICS & GYNECOLOGY

## 2017-08-02 NOTE — PROGRESS NOTES
CC: WILLIS visit    HPI:  Feeling more vaginal pressure.  No LOF or vb.  +FM.  No cramping or ctx.      PE - see vitals    A/P: 32 yo  @ 38w6d by LMP c/w 11w sujatha presents for WILLIS visit.   1. Continue routine prenatal care  - Continue PNV   - Term labor precautions  - Rh negative, s/p Rhogam on ; 1hr wnl; GBS negative  - Declined flu vaccine and quad screen  - RTC in 1 week       Becky CARMEN Friday

## 2017-08-05 ENCOUNTER — ANESTHESIA EVENT (OUTPATIENT)
Dept: LABOR AND DELIVERY | Facility: HOSPITAL | Age: 32
End: 2017-08-05

## 2017-08-05 ENCOUNTER — ANESTHESIA (OUTPATIENT)
Dept: LABOR AND DELIVERY | Facility: HOSPITAL | Age: 32
End: 2017-08-05

## 2017-08-05 ENCOUNTER — DOCUMENTATION (OUTPATIENT)
Dept: OBSTETRICS AND GYNECOLOGY | Facility: CLINIC | Age: 32
End: 2017-08-05

## 2017-08-05 ENCOUNTER — HOSPITAL ENCOUNTER (INPATIENT)
Facility: HOSPITAL | Age: 32
LOS: 2 days | Discharge: HOME | End: 2017-08-07
Attending: OBSTETRICS & GYNECOLOGY | Admitting: OBSTETRICS & GYNECOLOGY

## 2017-08-05 DIAGNOSIS — Z34.83 ENCOUNTER FOR SUPERVISION OF OTHER NORMAL PREGNANCY IN THIRD TRIMESTER: ICD-10-CM

## 2017-08-05 DIAGNOSIS — O26.891 RH NEGATIVE STATUS DURING PREGNANCY IN FIRST TRIMESTER, ANTEPARTUM: ICD-10-CM

## 2017-08-05 DIAGNOSIS — Z37.9 NORMAL LABOR: Primary | ICD-10-CM

## 2017-08-05 DIAGNOSIS — Z67.91 RH NEGATIVE STATUS DURING PREGNANCY IN FIRST TRIMESTER, ANTEPARTUM: ICD-10-CM

## 2017-08-05 LAB
ABO GROUP BLD: NORMAL
AMPHET+METHAMPHET UR QL: NEGATIVE
BARBITURATES UR QL SCN: NEGATIVE
BENZODIAZ UR QL SCN: NEGATIVE
BLD GP AB SCN SERPL QL: NEGATIVE
CANNABINOIDS SERPL QL: NEGATIVE
COCAINE UR QL: NEGATIVE
DEPRECATED RDW RBC AUTO: 44.5 FL (ref 36.4–46.3)
ERYTHROCYTE [DISTWIDTH] IN BLOOD BY AUTOMATED COUNT: 15.5 % (ref 11.5–14.5)
HCT VFR BLD AUTO: 37.2 % (ref 35–45)
HGB BLD-MCNC: 12 G/DL (ref 12–15.5)
Lab: NORMAL
MCH RBC QN AUTO: 25.6 PG (ref 26.5–34)
MCHC RBC AUTO-ENTMCNC: 32.3 G/DL (ref 31.4–36)
MCV RBC AUTO: 79.5 FL (ref 80–98)
METHADONE UR QL SCN: NEGATIVE
OPIATES UR QL: NEGATIVE
OXYCODONE UR QL SCN: NEGATIVE
PLATELET # BLD AUTO: 157 10*3/MM3 (ref 150–450)
PMV BLD AUTO: 11.6 FL (ref 8–12)
RBC # BLD AUTO: 4.68 10*6/MM3 (ref 3.77–5.16)
RH BLD: NEGATIVE
WBC NRBC COR # BLD: 11.54 10*3/MM3 (ref 3.2–9.8)

## 2017-08-05 PROCEDURE — 80307 DRUG TEST PRSMV CHEM ANLYZR: CPT | Performed by: OBSTETRICS & GYNECOLOGY

## 2017-08-05 PROCEDURE — 0KQM0ZZ REPAIR PERINEUM MUSCLE, OPEN APPROACH: ICD-10-PCS | Performed by: OBSTETRICS & GYNECOLOGY

## 2017-08-05 PROCEDURE — 10907ZC DRAINAGE OF AMNIOTIC FLUID, THERAPEUTIC FROM PRODUCTS OF CONCEPTION, VIA NATURAL OR ARTIFICIAL OPENING: ICD-10-PCS | Performed by: OBSTETRICS & GYNECOLOGY

## 2017-08-05 PROCEDURE — 86592 SYPHILIS TEST NON-TREP QUAL: CPT | Performed by: OBSTETRICS & GYNECOLOGY

## 2017-08-05 PROCEDURE — 85027 COMPLETE CBC AUTOMATED: CPT | Performed by: OBSTETRICS & GYNECOLOGY

## 2017-08-05 PROCEDURE — C1755 CATHETER, INTRASPINAL: HCPCS | Performed by: NURSE ANESTHETIST, CERTIFIED REGISTERED

## 2017-08-05 PROCEDURE — 59400 OBSTETRICAL CARE: CPT | Performed by: OBSTETRICS & GYNECOLOGY

## 2017-08-05 PROCEDURE — 86850 RBC ANTIBODY SCREEN: CPT | Performed by: OBSTETRICS & GYNECOLOGY

## 2017-08-05 PROCEDURE — 86901 BLOOD TYPING SEROLOGIC RH(D): CPT | Performed by: OBSTETRICS & GYNECOLOGY

## 2017-08-05 PROCEDURE — 25010000002 FENTANYL CITRATE (PF) 100 MCG/2ML SOLUTION: Performed by: NURSE ANESTHETIST, CERTIFIED REGISTERED

## 2017-08-05 PROCEDURE — 86900 BLOOD TYPING SEROLOGIC ABO: CPT | Performed by: OBSTETRICS & GYNECOLOGY

## 2017-08-05 RX ORDER — OXYTOCIN/RINGER'S LACTATE 20/1000 ML
999 PLASTIC BAG, INJECTION (ML) INTRAVENOUS ONCE
Status: COMPLETED | OUTPATIENT
Start: 2017-08-05 | End: 2017-08-05

## 2017-08-05 RX ORDER — LIDOCAINE HYDROCHLORIDE AND EPINEPHRINE 15; 5 MG/ML; UG/ML
INJECTION, SOLUTION EPIDURAL AS NEEDED
Status: DISCONTINUED | OUTPATIENT
Start: 2017-08-05 | End: 2017-08-05 | Stop reason: SURG

## 2017-08-05 RX ORDER — SODIUM CHLORIDE 0.9 % (FLUSH) 0.9 %
1-10 SYRINGE (ML) INJECTION AS NEEDED
Status: DISCONTINUED | OUTPATIENT
Start: 2017-08-05 | End: 2017-08-06 | Stop reason: HOSPADM

## 2017-08-05 RX ORDER — CARBOPROST TROMETHAMINE 250 UG/ML
250 INJECTION, SOLUTION INTRAMUSCULAR AS NEEDED
Status: DISCONTINUED | OUTPATIENT
Start: 2017-08-05 | End: 2017-08-06 | Stop reason: HOSPADM

## 2017-08-05 RX ORDER — IBUPROFEN 600 MG/1
TABLET ORAL
Status: COMPLETED
Start: 2017-08-05 | End: 2017-08-05

## 2017-08-05 RX ORDER — BUPIVACAINE HYDROCHLORIDE 2.5 MG/ML
INJECTION, SOLUTION EPIDURAL; INFILTRATION; INTRACAUDAL AS NEEDED
Status: DISCONTINUED | OUTPATIENT
Start: 2017-08-05 | End: 2017-08-05 | Stop reason: SURG

## 2017-08-05 RX ORDER — METHYLERGONOVINE MALEATE 0.2 MG/ML
200 INJECTION INTRAVENOUS ONCE AS NEEDED
Status: DISCONTINUED | OUTPATIENT
Start: 2017-08-05 | End: 2017-08-06 | Stop reason: HOSPADM

## 2017-08-05 RX ORDER — LIDOCAINE HYDROCHLORIDE 10 MG/ML
5 INJECTION, SOLUTION INFILTRATION; PERINEURAL AS NEEDED
Status: DISCONTINUED | OUTPATIENT
Start: 2017-08-05 | End: 2017-08-06 | Stop reason: HOSPADM

## 2017-08-05 RX ORDER — PROMETHAZINE HYDROCHLORIDE 25 MG/ML
12.5 INJECTION, SOLUTION INTRAMUSCULAR; INTRAVENOUS
Status: DISCONTINUED | OUTPATIENT
Start: 2017-08-05 | End: 2017-08-06

## 2017-08-05 RX ORDER — BUTORPHANOL TARTRATE 1 MG/ML
1 INJECTION, SOLUTION INTRAMUSCULAR; INTRAVENOUS
Status: DISCONTINUED | OUTPATIENT
Start: 2017-08-05 | End: 2017-08-06 | Stop reason: HOSPADM

## 2017-08-05 RX ORDER — CALCIUM CARBONATE 200(500)MG
2 TABLET,CHEWABLE ORAL 3 TIMES DAILY PRN
Status: DISCONTINUED | OUTPATIENT
Start: 2017-08-05 | End: 2017-08-06 | Stop reason: HOSPADM

## 2017-08-05 RX ORDER — OXYTOCIN/RINGER'S LACTATE 20/1000 ML
PLASTIC BAG, INJECTION (ML) INTRAVENOUS
Status: COMPLETED
Start: 2017-08-05 | End: 2017-08-05

## 2017-08-05 RX ORDER — IBUPROFEN 600 MG/1
600 TABLET ORAL EVERY 8 HOURS SCHEDULED
Status: DISCONTINUED | OUTPATIENT
Start: 2017-08-05 | End: 2017-08-06 | Stop reason: HOSPADM

## 2017-08-05 RX ORDER — SODIUM CHLORIDE, SODIUM LACTATE, POTASSIUM CHLORIDE, CALCIUM CHLORIDE 600; 310; 30; 20 MG/100ML; MG/100ML; MG/100ML; MG/100ML
INJECTION, SOLUTION INTRAVENOUS
Status: COMPLETED
Start: 2017-08-05 | End: 2017-08-05

## 2017-08-05 RX ORDER — FENTANYL CITRATE 50 UG/ML
INJECTION, SOLUTION INTRAMUSCULAR; INTRAVENOUS AS NEEDED
Status: DISCONTINUED | OUTPATIENT
Start: 2017-08-05 | End: 2017-08-05 | Stop reason: SURG

## 2017-08-05 RX ORDER — OXYTOCIN/RINGER'S LACTATE 20/1000 ML
125 PLASTIC BAG, INJECTION (ML) INTRAVENOUS AS NEEDED
Status: DISCONTINUED | OUTPATIENT
Start: 2017-08-05 | End: 2017-08-06 | Stop reason: HOSPADM

## 2017-08-05 RX ORDER — MISOPROSTOL 200 UG/1
800 TABLET ORAL AS NEEDED
Status: DISCONTINUED | OUTPATIENT
Start: 2017-08-05 | End: 2017-08-06 | Stop reason: HOSPADM

## 2017-08-05 RX ORDER — OXYCODONE HYDROCHLORIDE AND ACETAMINOPHEN 5; 325 MG/1; MG/1
2 TABLET ORAL EVERY 4 HOURS PRN
Status: DISCONTINUED | OUTPATIENT
Start: 2017-08-05 | End: 2017-08-06 | Stop reason: HOSPADM

## 2017-08-05 RX ADMIN — Medication 999 ML/HR: at 22:15

## 2017-08-05 RX ADMIN — BUPIVACAINE HYDROCHLORIDE 8 ML: 2.5 INJECTION, SOLUTION EPIDURAL; INFILTRATION; INTRACAUDAL; PERINEURAL at 20:36

## 2017-08-05 RX ADMIN — Medication 10 ML/HR: at 20:40

## 2017-08-05 RX ADMIN — LIDOCAINE HYDROCHLORIDE 3 ML: 10 INJECTION, SOLUTION INFILTRATION; PERINEURAL at 20:20

## 2017-08-05 RX ADMIN — IBUPROFEN 600 MG: 600 TABLET ORAL at 23:37

## 2017-08-05 RX ADMIN — FENTANYL CITRATE 100 MCG: 50 INJECTION, SOLUTION INTRAMUSCULAR; INTRAVENOUS at 20:36

## 2017-08-05 RX ADMIN — EPHEDRINE SULFATE 10 MG: 50 INJECTION INTRAMUSCULAR; INTRAVENOUS; SUBCUTANEOUS at 20:40

## 2017-08-05 RX ADMIN — SODIUM CHLORIDE, POTASSIUM CHLORIDE, SODIUM LACTATE AND CALCIUM CHLORIDE 1000 ML: 600; 310; 30; 20 INJECTION, SOLUTION INTRAVENOUS at 20:48

## 2017-08-05 RX ADMIN — Medication 999 UNITS: at 23:15

## 2017-08-05 RX ADMIN — LIDOCAINE HYDROCHLORIDE AND EPINEPHRINE 3 ML: 15; 5 INJECTION, SOLUTION EPIDURAL at 20:31

## 2017-08-05 RX ADMIN — SODIUM CHLORIDE, POTASSIUM CHLORIDE, SODIUM LACTATE AND CALCIUM CHLORIDE 1000 ML: 600; 310; 30; 20 INJECTION, SOLUTION INTRAVENOUS at 19:58

## 2017-08-05 NOTE — PROGRESS NOTES
The pt called with complaints of contractions. Were frequent but intense overnight. Now they are less frequent and less intense. Notes good fetal movement. Has had some slight bleeding.     I told her that the bleeding was probably some show. Should the bleeding get worse, she ruptures her membranes, notes decreased fetal movement or increase in the strength or frequency of her contractions, she should come in. She may be in very early labor          This document has been electronically signed by Jorge Lyon MD on August 5, 2017 9:37 AM

## 2017-08-06 LAB
ANISOCYTOSIS BLD QL: NORMAL
BASOPHILS # BLD AUTO: 0.02 10*3/MM3 (ref 0–0.2)
BASOPHILS NFR BLD AUTO: 0.2 % (ref 0–2)
DEPRECATED RDW RBC AUTO: 45.1 FL (ref 36.4–46.3)
EOSINOPHIL # BLD AUTO: 0.04 10*3/MM3 (ref 0–0.7)
EOSINOPHIL NFR BLD AUTO: 0.3 % (ref 0–7)
ERYTHROCYTE [DISTWIDTH] IN BLOOD BY AUTOMATED COUNT: 15.6 % (ref 11.5–14.5)
FETAL BLEED: NEGATIVE
HCT VFR BLD AUTO: 31.3 % (ref 35–45)
HGB BLD-MCNC: 10 G/DL (ref 12–15.5)
IMM GRANULOCYTES # BLD: 0.04 10*3/MM3 (ref 0–0.02)
IMM GRANULOCYTES NFR BLD: 0.3 % (ref 0–0.5)
LYMPHOCYTES # BLD AUTO: 2.41 10*3/MM3 (ref 0.6–4.2)
LYMPHOCYTES NFR BLD AUTO: 19 % (ref 10–50)
MCH RBC QN AUTO: 25.5 PG (ref 26.5–34)
MCHC RBC AUTO-ENTMCNC: 31.9 G/DL (ref 31.4–36)
MCV RBC AUTO: 79.8 FL (ref 80–98)
MONOCYTES # BLD AUTO: 0.97 10*3/MM3 (ref 0–0.9)
MONOCYTES NFR BLD AUTO: 7.6 % (ref 0–12)
NEUTROPHILS # BLD AUTO: 9.22 10*3/MM3 (ref 2–8.6)
NEUTROPHILS NFR BLD AUTO: 72.6 % (ref 37–80)
NUMBER OF DOSES: NORMAL
PLATELET # BLD AUTO: 152 10*3/MM3 (ref 150–450)
PMV BLD AUTO: 12.7 FL (ref 8–12)
POLYCHROMASIA BLD QL SMEAR: NORMAL
RBC # BLD AUTO: 3.92 10*6/MM3 (ref 3.77–5.16)
SMALL PLATELETS BLD QL SMEAR: ADEQUATE
WBC MORPH BLD: NORMAL
WBC NRBC COR # BLD: 12.7 10*3/MM3 (ref 3.2–9.8)

## 2017-08-06 PROCEDURE — C1755 CATHETER, INTRASPINAL: HCPCS

## 2017-08-06 PROCEDURE — 25010000003 RHO D IMMUNE GLOBULIN 1500 UNITS SOLUTION PREFILLED SYRINGE: Performed by: OBSTETRICS & GYNECOLOGY

## 2017-08-06 PROCEDURE — 85025 COMPLETE CBC W/AUTO DIFF WBC: CPT | Performed by: OBSTETRICS & GYNECOLOGY

## 2017-08-06 PROCEDURE — 85461 HEMOGLOBIN FETAL: CPT | Performed by: OBSTETRICS & GYNECOLOGY

## 2017-08-06 PROCEDURE — 51703 INSERT BLADDER CATH COMPLEX: CPT

## 2017-08-06 PROCEDURE — 85007 BL SMEAR W/DIFF WBC COUNT: CPT | Performed by: OBSTETRICS & GYNECOLOGY

## 2017-08-06 RX ORDER — ZOLPIDEM TARTRATE 5 MG/1
5 TABLET ORAL NIGHTLY PRN
Status: DISCONTINUED | OUTPATIENT
Start: 2017-08-06 | End: 2017-08-07 | Stop reason: HOSPADM

## 2017-08-06 RX ORDER — SODIUM CHLORIDE 0.9 % (FLUSH) 0.9 %
1-10 SYRINGE (ML) INJECTION AS NEEDED
Status: DISCONTINUED | OUTPATIENT
Start: 2017-08-06 | End: 2017-08-07 | Stop reason: HOSPADM

## 2017-08-06 RX ORDER — OXYCODONE HYDROCHLORIDE AND ACETAMINOPHEN 5; 325 MG/1; MG/1
1 TABLET ORAL EVERY 4 HOURS PRN
Status: DISCONTINUED | OUTPATIENT
Start: 2017-08-06 | End: 2017-08-07 | Stop reason: HOSPADM

## 2017-08-06 RX ORDER — ONDANSETRON 4 MG/1
4 TABLET, FILM COATED ORAL EVERY 8 HOURS PRN
Status: DISCONTINUED | OUTPATIENT
Start: 2017-08-06 | End: 2017-08-07 | Stop reason: HOSPADM

## 2017-08-06 RX ORDER — OXYTOCIN/RINGER'S LACTATE 20/1000 ML
1000 PLASTIC BAG, INJECTION (ML) INTRAVENOUS CONTINUOUS
Status: DISCONTINUED | OUTPATIENT
Start: 2017-08-06 | End: 2017-08-06

## 2017-08-06 RX ORDER — IBUPROFEN 800 MG/1
800 TABLET ORAL EVERY 6 HOURS SCHEDULED
Status: DISCONTINUED | OUTPATIENT
Start: 2017-08-06 | End: 2017-08-07 | Stop reason: HOSPADM

## 2017-08-06 RX ORDER — DOCUSATE SODIUM 100 MG/1
100 CAPSULE, LIQUID FILLED ORAL 2 TIMES DAILY
Status: DISCONTINUED | OUTPATIENT
Start: 2017-08-06 | End: 2017-08-07 | Stop reason: HOSPADM

## 2017-08-06 RX ORDER — PRENATAL VIT/IRON FUM/FOLIC AC 27MG-0.8MG
1 TABLET ORAL DAILY
Status: DISCONTINUED | OUTPATIENT
Start: 2017-08-06 | End: 2017-08-07 | Stop reason: HOSPADM

## 2017-08-06 RX ORDER — CALCIUM CARBONATE 200(500)MG
2 TABLET,CHEWABLE ORAL 3 TIMES DAILY PRN
Status: DISCONTINUED | OUTPATIENT
Start: 2017-08-06 | End: 2017-08-07 | Stop reason: HOSPADM

## 2017-08-06 RX ORDER — LANOLIN 100 %
OINTMENT (GRAM) TOPICAL
Status: DISCONTINUED | OUTPATIENT
Start: 2017-08-06 | End: 2017-08-07 | Stop reason: HOSPADM

## 2017-08-06 RX ORDER — BISACODYL 10 MG
10 SUPPOSITORY, RECTAL RECTAL DAILY PRN
Status: DISCONTINUED | OUTPATIENT
Start: 2017-08-06 | End: 2017-08-07 | Stop reason: HOSPADM

## 2017-08-06 RX ORDER — MISOPROSTOL 200 UG/1
600 TABLET ORAL ONCE AS NEEDED
Status: DISCONTINUED | OUTPATIENT
Start: 2017-08-06 | End: 2017-08-07 | Stop reason: HOSPADM

## 2017-08-06 RX ADMIN — BENZOCAINE AND MENTHOL: 20; .5 SPRAY TOPICAL at 01:45

## 2017-08-06 RX ADMIN — OXYCODONE HYDROCHLORIDE AND ACETAMINOPHEN 1 TABLET: 5; 325 TABLET ORAL at 10:06

## 2017-08-06 RX ADMIN — IBUPROFEN 800 MG: 800 TABLET ORAL at 12:45

## 2017-08-06 RX ADMIN — DOCUSATE SODIUM 100 MG: 100 CAPSULE, LIQUID FILLED ORAL at 17:39

## 2017-08-06 RX ADMIN — IBUPROFEN 800 MG: 800 TABLET ORAL at 17:39

## 2017-08-06 RX ADMIN — OXYCODONE HYDROCHLORIDE AND ACETAMINOPHEN 1 TABLET: 5; 325 TABLET ORAL at 17:40

## 2017-08-06 RX ADMIN — DOCUSATE SODIUM 100 MG: 100 CAPSULE, LIQUID FILLED ORAL at 09:51

## 2017-08-06 RX ADMIN — HUMAN RHO(D) IMMUNE GLOBULIN 300 MCG: 300 INJECTION, SOLUTION INTRAMUSCULAR at 17:42

## 2017-08-06 RX ADMIN — PRENATAL VIT W/ FE FUMARATE-FA TAB 27-0.8 MG 1 TABLET: 27-0.8 TAB at 09:51

## 2017-08-06 RX ADMIN — IBUPROFEN 800 MG: 800 TABLET ORAL at 05:18

## 2017-08-06 NOTE — ANESTHESIA POSTPROCEDURE EVALUATION
Patient: Landen Guzmán    Procedure Summary     Date Anesthesia Start Anesthesia Stop Room / Location    08/05/17 2009 2242        Procedure Diagnosis Scheduled Providers Provider    LABOR ANALGESIA No diagnosis on file.  Donnie Montenegro CRNA          Anesthesia Type: epidural  Last vitals  BP        Temp   98.3 °F (36.8 °C) (08/05/17 2230)    Pulse       Resp   18 (08/05/17 2230)    SpO2          Post Anesthesia Care and Evaluation    Patient location during evaluation: bedside  Patient participation: complete - patient participated  Level of consciousness: awake and alert  Pain score: 0  Pain management: adequate  Airway patency: patent  Anesthetic complications: No anesthetic complications  PONV Status: none  Cardiovascular status: acceptable and stable  Respiratory status: acceptable and spontaneous ventilation  Hydration status: acceptable  Post Neuraxial Block status: No signs or symptoms of PDPH  Comments: Able to move legs. Not baseline yet.

## 2017-08-06 NOTE — PROGRESS NOTES
[facility]  Landen Guzmán  : 1985  MRN: [id number]  CSN: [id number]    Labor progress note    comfortable with epidural and pelvic pressure    Min/max vitals past 24 hours:  Temp  Min: 98 °F (36.7 °C)  Max: 98 °F (36.7 °C)   BP  Min: 102/58  Max: 118/58   Pulse  Min: 77  Max: 163   Resp  Min: 16  Max: 16        FHT's: Baseline [id number], moderate variability and (+) Accelerations   Cervix: was checked:/ vertex/ -2   Contractions: regular         Plan   1. AROM - clear fluid seen   2. Continue with expectant management    Jorge Lyon MD  2017  9:07 PM

## 2017-08-06 NOTE — ANESTHESIA PROCEDURE NOTES
Labor Epidural    Patient location during procedure: OB  Preanesthetic Checklist  Completed: patient identified, site marked, surgical consent, pre-op evaluation, timeout performed, IV checked, risks and benefits discussed and monitors and equipment checked  Prep:  Pt Position:sitting  Sterile Tech:cap, gloves, mask and sterile barrier  Prep:povidone-iodine 7.5% surgical scrub  Monitoring:blood pressure monitoring, continuous pulse oximetry and EKG  Epidural Block Procedure:  Approach:midline  Guidance:landmark technique and palpation technique  Location:L3-L4  Needle Type:Tuohy  Needle Gauge:19 G  Loss of Resistance Medium: saline  Loss of Resistance: 9cm  Cath Depth at skin:13 cm  Paresthesia: none  Aspiration:negative  Test Dose:negative  Number of Attempts: 1  Post Assessment:  Dressing:occlusive dressing applied and secured with tape  Pt Tolerance:patient tolerated the procedure well with no apparent complications  Complications:no

## 2017-08-06 NOTE — PLAN OF CARE
Problem: Patient Care Overview (Adult)  Goal: Plan of Care Review  Outcome: Ongoing (interventions implemented as appropriate)    08/06/17 0546   Coping/Psychosocial Response Interventions   Plan Of Care Reviewed With patient   Patient Care Overview   Progress improving   Outcome Evaluation   Outcome Summary/Follow up Plan VSS. Minimal bleeding. Fundus Firm. Pain managed with scheduled motrin. Pt ambulates and voids        Goal: Adult Individualization and Mutuality  Outcome: Ongoing (interventions implemented as appropriate)  Goal: Discharge Needs Assessment  Outcome: Ongoing (interventions implemented as appropriate)    Problem: Postpartum, Vaginal Delivery (Adult)  Goal: Signs and Symptoms of Listed Potential Problems Will be Absent or Manageable (Postpartum, Vaginal Delivery)  Outcome: Ongoing (interventions implemented as appropriate)

## 2017-08-06 NOTE — PLAN OF CARE
Problem: Labor (Cervical Ripen, Induct, Augment) (Adult,Obstetrics,Pediatric)  Goal: Signs and Symptoms of Listed Potential Problems Will be Absent or Manageable (Labor)  Outcome: Outcome(s) achieved Date Met:  08/05/17 08/05/17 2340   Labor (Cervical Ripen, Induct, Augment)   Problems Assessed (Labor) all   Problems Present (Labor) none         Problem: Anesthesia/Analgesia, Neuraxial (Obstetrics)  Goal: Signs and Symptoms of Listed Potential Problems Will be Absent or Manageable (Anesthesia/Analgesia, Neuraxial)  Outcome: Outcome(s) achieved Date Met:  08/05/17 08/05/17 2340   Anesthesia/Analgesia, Neuraxial   Problems Assessed (Neuraxial Anesthesia/Analgesia, OB) all   Problems Present (Neuraxial Anesthesia/Analgesia, OB) pain

## 2017-08-06 NOTE — PROGRESS NOTES
[facility]  Landen Guzmán  : 1985  MRN: [id number]  CSN: [id number]    History and Physical    Subjective   Landen Guzmán is a 31 y.o. year old  with an Estimated Date of Delivery: 8/10/17 currently at 39w2d presenting with regular contractions occuring every 3-4 minutes.. The patient notes good fetal movement, light bleeding and no rupture of membranes    Her prenatal care has been benign.    Obstetric History       T1      TAB0   SAB0   E0   M0   L1       # Outcome Date GA Lbr [place]/2nd Weight Sex Delivery Anes PTL Lv   2 Current            1 Term 05/29/15 40w5d  8 lb 7 oz (3.827 kg) F Vag-Spont EPI N Y      Name: Yamilka        Past Medical History:   Diagnosis Date   • Dysmenorrhea    • Encounter for gynecological examination    • Encounter for  visit    • Low back pain    • Primigravida    • Viral upper respiratory tract infection      Past Surgical History:   Procedure Laterality Date   • COLONOSCOPY  2011    normal colonoscopy   • PAP SMEAR  10/27/2010    negative   • WISDOM TOOTH EXTRACTION         Current Facility-Administered Medications:   •  butorphanol (STADOL) injection 1 mg, 1 mg, Intravenous, Q2H PRN, Jorge Lyon MD  •  butorphanol (STADOL) injection 2 mg, 2 mg, Intravenous, Q2H PRN, Jorge Lyon MD  •  lactated ringers bolus 1,000 mL, 1,000 mL, Intravenous, Once, Jorge Lyon MD  •  lactated ringers infusion  - ADS Override Pull, , , ,   •  lidocaine (XYLOCAINE) 1 % injection 5 mL, 5 mL, Intradermal, PRN, Jorge Lyon MD  •  penicillin G potassium 5 Million Units in sodium chloride 0.9 % 100 mL IVPB, 5 Million Units, Intravenous, Once **FOLLOWED BY** penicillin G potassium 3 Million Units in sodium chloride 0.9 % 50 mL IVPB, 3 Million Units, Intravenous, Q4H, Jorge Lyon MD  •  sodium chloride 0.9 % flush 1-10 mL, 1-10 mL, Intravenous, PRN, Jorge Lyon MD    Allergies   Allergen Reactions   • Nickel   "    Smoking status: Never Smoker                                                                 Smokeless status: Not on file                       Review of Systems   Constitutional: Negative for activity change, appetite change, chills and fever.   Gastrointestinal: Negative for abdominal distention and abdominal pain.   Genitourinary: Negative for difficulty urinating, dysuria, flank pain, genital sores, pelvic pain, vaginal bleeding, vaginal discharge and vaginal pain.         Objective   Pulse (!) 163  Temp 98 °F (36.7 °C) (Oral)   Resp 16  Ht 66\" (167.6 cm)  Wt 230 lb (104 kg)  LMP 11/03/2016 (Approximate)  SpO2 100%  BMI 37.12 kg/m2  General: well developed; well nourished  no acute distress   Heart: regular rate and rhythm   Lungs: breathing is unlabored  clear to auscultation bilaterally   Abdomen: soft, non-tender; no masses  no umbilical or inginual hernias are present  no hepato-splenomegaly   FHT's: reactive and category 2  external monitors used   Cervix: was checked (by me): 5.5 cm / 100 % / -3   Presentation: cephalic   Contractions: regular     Prenatal Labs  Lab Results   Component Value Date    HEPBSAG Negative 12/30/2016    ABO O 12/30/2016    RH NEG 12/30/2016    HEPCVIRUSABY Negative 12/30/2016    TSXZDOC4CG 105 05/26/2017       Current Labs Reviewed   No data reviewed       Assessment   1. IUP at 39w2d  2. Prenatal care was unremarkable   3. Group B strep status: negative  4. Desires epidural     Plan   1. Expectant management  2. OK for epidural  3. Admit in labor          This document has been electronically signed by Jorge Lyon MD on August 5, 2017 7:44 PM    "

## 2017-08-06 NOTE — H&P
[facility]  Landen Guzmán  :             1985  MRN:             [id number]  CSN:              [id number]     History and Physical        Subjective       Landen Guzmán is a 31 y.o. year old  with an Estimated Date of Delivery: 8/10/17 currently at 39w2d presenting with regular contractions occuring every 3-4 minutes.. The patient notes good fetal movement, light bleeding and no rupture of membranes     Her prenatal care has been benign.                  Obstetric History       T1      TAB0   SAB0   E0   M0   L1        # Outcome Date GA Lbr Troy/2nd Weight Sex Delivery Anes PTL Lv   2 Current                     1 Term 05/29/15 40w5d   8 lb 7 oz (3.827 kg) F Vag-Spont EPI N Y      Name: Yamilka           Medical History         Past Medical History:   Diagnosis Date   • Dysmenorrhea     • Encounter for gynecological examination     • Encounter for  visit     • Low back pain     • Primigravida     • Viral upper respiratory tract infection            Surgical History          Past Surgical History:   Procedure Laterality Date   • COLONOSCOPY   2011     normal colonoscopy   • PAP SMEAR   10/27/2010     negative   • WISDOM TOOTH EXTRACTION                Current Facility-Administered Medications:   •  butorphanol (STADOL) injection 1 mg, 1 mg, Intravenous, Q2H PRN, Jorge Lyon MD  •  butorphanol (STADOL) injection 2 mg, 2 mg, Intravenous, Q2H PRN, Jorge Lyon MD  •  lactated ringers bolus 1,000 mL, 1,000 mL, Intravenous, Once, Jorge Lyon MD  •  lactated ringers infusion  - ADS Override Pull, , , ,   •  lidocaine (XYLOCAINE) 1 % injection 5 mL, 5 mL, Intradermal, PRN, Jorge Lyon MD  •  penicillin G potassium 5 Million Units in sodium chloride 0.9 % 100 mL IVPB, 5 Million Units, Intravenous, Once **FOLLOWED BY** penicillin G potassium 3 Million Units in sodium chloride 0.9 % 50 mL IVPB, 3 Million Units, Intravenous, Q4H, Jorge Lyon,  "MD  •  sodium chloride 0.9 % flush 1-10 mL, 1-10 mL, Intravenous, PRN, Jorge Lyon MD          Allergies   Allergen Reactions   • Nickel        Smoking status: Never Smoker                                                                  Smokeless status: Not on file                        Review of Systems   Constitutional: Negative for activity change, appetite change, chills and fever.   Gastrointestinal: Negative for abdominal distention and abdominal pain.   Genitourinary: Negative for difficulty urinating, dysuria, flank pain, genital sores, pelvic pain, vaginal bleeding, vaginal discharge and vaginal pain.               Objective       Pulse (!) 163  Temp 98 °F (36.7 °C) (Oral)   Resp 16  Ht 66\" (167.6 cm)  Wt 230 lb (104 kg)  LMP 11/03/2016 (Approximate)  SpO2 100%  BMI 37.12 kg/m2        General: well developed; well nourished  no acute distress    Heart: regular rate and rhythm    Lungs: breathing is unlabored  clear to auscultation bilaterally    Abdomen: soft, non-tender; no masses  no umbilical or inginual hernias are present  no hepato-splenomegaly    FHT's: reactive and category 2  external monitors used    Cervix: was checked (by me): 5.5 cm / 100 % / -3   Presentation: cephalic   Contractions: regular      Prenatal Labs        Lab Results   Component Value Date     HEPBSAG Negative 12/30/2016     ABO O 12/30/2016     RH NEG 12/30/2016     HEPCVIRUSABY Negative 12/30/2016     PDXMCXX6VH 105 05/26/2017         Current Labs Reviewed   No data reviewed           Assessment      1. IUP at 39w2d  2. Prenatal care was unremarkable                    3. Group B strep status: negative  4. Desires epidural        Plan       1. Expectant management  2. OK for epidural  3. Admit in labor             This document has been electronically signed by Jorge Lyon MD on August 5, 2017 7:44 PM               Revision History       Date/Time User Provider Type Action     8/5/2017  7:47 PM Jorge MCCLELLAN" MD [facility] Physician Addend     8/5/2017  7:46 PM Jorge Lyon MD Physician Sign     View Details Report

## 2017-08-06 NOTE — L&D DELIVERY NOTE
[facility]  Vaginal Delivery Note    Delivery     Delivery: Vaginal, Spontaneous Delivery     YOB: 2015    Time of Birth: 10:10 PM      Anesthesia: Epidural     Delivering clinician: Jorge Lyon    Forceps?   No   Vacuum? No    Shoulder dystocia present: No        Delivery narrative:  The mother was checked and noted to be fully dilated. She began pushing and over the course of 15 minutes she brought the vertex to  and easily delivered the vertex. There was no nuchal cord. The remainder of the infant was easily delivered with the right shoulder presenting anteriorly. The infant was delivered and placed on the maternal abdomen. The cord was doubly clamped and cut. Cord blood was obtained. There was a second degree midline laceration repaired in layers with 2-0 vicryl. A small amount of clot was then removed from the lower uterine segment and cervix. No other lacerations were noted. EBL is approx 300 cc. Mother and infant are recovering in the LDR in stable condition.    Infant    Findings: male  infant     Infant observations: Weight: 7 lb 13.9 oz (3.57 kg)   Length: 21.5  in  Observations/Comments:         Apgars: 8   @ 1 minute /    9   @ 5 minutes   Infant Name:      Placenta, Cord, and Fluid    Placenta delivered  Spontaneous  at    10:15 PM     Cord: 3 vessels  present.   Nuchal Cord?  no   Cord blood obtained: Yes    Cord gases obtained:  No    Cord gas results: Venous:  No results found for: PHCVEN    Arterial:  No results found for: PHCART     Repair    Episiotomy: None    Lacerations: Yes  Laceration Information  Laceration Repaired?   Perineal:         Periurethral:         Labial:         Sulcus:         Vaginal:         Cervical:              Estimated Blood Loss:       Suture used for repair: 2-0 Vicryl and 2-0 Vicryl     Complications  none    Disposition  Mother to Mother Baby/Postpartum  in stable condition currently.  Baby to remains with mom  in stable condition  currently.    This note was electronically signed:    Jorge Lyon MD  08/05/17  10:33 PM

## 2017-08-06 NOTE — PROGRESS NOTES
[facility]  Landen Guzmán  : 1985  MRN: [id number]  CSN: [id number]    Postpartum Day #1  Subjective   The patient has no complaints      Objective     Min/max vitals past 24 hours:   Temp  Min: 97.2 °F (36.2 °C)  Max: 98.4 °F (36.9 °C)  BP  Min: 102/50  Max: 125/66  Pulse  Min: 72  Max: 163  Pulse  Min: 72  Max: 163        Abdomen: soft, non-tender; bowel sounds normal; no masses   fundus firm and non-tender   Calves: Nontender, no cords palpable   Pelvic: deferred     Lab Results   Component Value Date    WBC 11.54 (H) 2017    HGB 12.0 2017    HCT 37.2 2017    MCV 79.5 (L) 2017     2017    RH Negative 2017    HEPBSAG Negative 2016        Assessment   1. Postpartum Day #1 S/P vaginal delivery     Plan   1. Continue routine postpartum care    Jorge Lyon MD  2017  5:46 AM

## 2017-08-06 NOTE — ANESTHESIA PREPROCEDURE EVALUATION
Anesthesia Evaluation     NPO Solid Status: > 8 hours       Airway   Mallampati: II  TM distance: >3 FB  Neck ROM: full  no difficulty expected  Dental - normal exam     Pulmonary - normal exam    breath sounds clear to auscultation  (+) recent URI resolved,   Cardiovascular - negative cardio ROS and normal exam  Exercise tolerance: excellent (>7 METS)    Rhythm: regular  Rate: normal        Neuro/Psych- negative ROS  GI/Hepatic/Renal/Endo    (+) obesity,      Musculoskeletal     Abdominal    Substance History      OB/GYN    (+) Pregnant,         Other                                      Anesthesia Plan    ASA 2 - emergent     epidural     Anesthetic plan and risks discussed with patient.    Plan discussed with CRNA.

## 2017-08-07 VITALS
DIASTOLIC BLOOD PRESSURE: 66 MMHG | TEMPERATURE: 97.9 F | WEIGHT: 230 LBS | RESPIRATION RATE: 18 BRPM | HEIGHT: 66 IN | SYSTOLIC BLOOD PRESSURE: 120 MMHG | OXYGEN SATURATION: 99 % | BODY MASS INDEX: 36.96 KG/M2 | HEART RATE: 87 BPM

## 2017-08-07 PROBLEM — Z34.93 ENCOUNTER FOR SUPERVISION OF NORMAL PREGNANCY IN THIRD TRIMESTER: Status: RESOLVED | Noted: 2017-02-20 | Resolved: 2017-08-07

## 2017-08-07 PROBLEM — Z37.9 NORMAL LABOR: Status: RESOLVED | Noted: 2017-08-05 | Resolved: 2017-08-07

## 2017-08-07 RX ORDER — IBUPROFEN 800 MG/1
800 TABLET ORAL EVERY 6 HOURS SCHEDULED
Qty: 30 TABLET | Refills: 1 | Status: SHIPPED | OUTPATIENT
Start: 2017-08-07 | End: 2018-08-15

## 2017-08-07 RX ORDER — IBUPROFEN 800 MG/1
TABLET ORAL
Status: COMPLETED
Start: 2017-08-07 | End: 2017-08-07

## 2017-08-07 RX ADMIN — IBUPROFEN 800 MG: 800 TABLET ORAL at 05:30

## 2017-08-07 RX ADMIN — IBUPROFEN 800 MG: 800 TABLET ORAL at 00:00

## 2017-08-07 RX ADMIN — DOCUSATE SODIUM 100 MG: 100 CAPSULE, LIQUID FILLED ORAL at 09:18

## 2017-08-07 RX ADMIN — PRENATAL VIT W/ FE FUMARATE-FA TAB 27-0.8 MG 1 TABLET: 27-0.8 TAB at 09:19

## 2017-08-07 NOTE — PLAN OF CARE
Problem: Patient Care Overview (Adult)  Goal: Plan of Care Review  Outcome: Ongoing (interventions implemented as appropriate)    08/06/17 0546 08/07/17 0614   Coping/Psychosocial Response Interventions   Plan Of Care Reviewed With --  patient   Patient Care Overview   Progress --  improving   Outcome Evaluation   Outcome Summary/Follow up Plan VSS. Minimal bleeding. Fundus Firm. Pain managed with scheduled motrin. Pt ambulates and voids  --        Goal: Adult Individualization and Mutuality  Outcome: Ongoing (interventions implemented as appropriate)  Goal: Discharge Needs Assessment  Outcome: Ongoing (interventions implemented as appropriate)    Problem: Postpartum, Vaginal Delivery (Adult)  Goal: Signs and Symptoms of Listed Potential Problems Will be Absent or Manageable (Postpartum, Vaginal Delivery)  Outcome: Ongoing (interventions implemented as appropriate)

## 2017-08-07 NOTE — PROGRESS NOTES
POSTPARTUM PROGRESS NOTE  NAME: Landen Guzmán  : 1985  MRN: [id number]      LOS: 2 days     Chief Complaint: PPD#2 s/p vaginal delivery w/ no complaints.     Subjective:     Interval History:  Pain well controlled with medications, , lochia minimal, (+) voiding, (+) ambulation.   Review of Systems:   GEN: No fever/chills, no dizziness or lightheadedness  CVS: No chest pain, no palpitations  RESP: No shortness of breath  GI: No nausea or vomiting    Objective:     Vital Signs  Temp:  [97.2 °F (36.2 °C)-98 °F (36.7 °C)] 97.9 °F (36.6 °C)  Heart Rate:  [64-78] 75  Resp:  [16-20] 16  BP: (106-138)/(57-68) 138/59    Physical Exam  GEN: A&O x3, NAD  CVS: RRR, S1/S2, no m/g/r  LUNGS: CTAB, no wheezes, no rhonchi  ABD: Soft, Nontender  Fundus: firm below umbilicus  EXT: no edema, no calf tenderness bilaterally.    Medication Review    Current Facility-Administered Medications:   •  benzocaine-lanolin-aloe vera (DERMOPLAST) 20-0.5 % topical spray, , Topical, PRN, Jorge Lyon MD  •  bisacodyl (DULCOLAX) suppository 10 mg, 10 mg, Rectal, Daily PRN, Jorge Lyon MD  •  calcium carbonate (TUMS) chewable tablet 500 mg (200 mg elemental), 2 tablet, Oral, TID PRN, Jorge Lyon MD  •  docusate sodium (COLACE) capsule 100 mg, 100 mg, Oral, BID, Jorge Lyon MD, 100 mg at 17 1739  •  hydrocortisone (ANUSOL-HC) 2.5 % rectal cream 1 application, 1 application, Rectal, PRN, Jorge Lyon MD  •  ibuprofen (ADVIL,MOTRIN) tablet 800 mg, 800 mg, Oral, Q6H, Jorge Lyon MD, 800 mg at 17 0000  •  lanolin ointment, , Topical, Q1H PRN, Jorge Lyno MD  •  magnesium hydroxide (MILK OF MAGNESIA) suspension 2400 mg/10mL 10 mL, 10 mL, Oral, Daily PRN, Jorge Lyon MD  •  misoprostol (CYTOTEC) tablet 600 mcg, 600 mcg, Sublingual, Once PRN, Jorge Lyon MD  •  ondansetron (ZOFRAN) tablet 4 mg, 4 mg, Oral, Q8H PRN, Jorge Lyon MD  •  oxyCODONE-acetaminophen  (PERCOCET) 5-325 MG per tablet 1 tablet, 1 tablet, Oral, Q4H PRN, Jorge Lyon MD, 1 tablet at 08/06/17 1740  •  oxyCODONE-acetaminophen (PERCOCET) 5-325 MG per tablet 1 tablet, 1 tablet, Oral, Q4H PRN, Jorge Lyon MD  •  prenatal vitamin 27-0.8 tablet 1 tablet, 1 tablet, Oral, Daily, Jorge Lyon MD, 1 tablet at 08/06/17 0951  •  sodium chloride 0.9 % flush 1-10 mL, 1-10 mL, Intravenous, PRN, Jorge Lyon MD  •  Tdap (BOOSTRIX) injection 0.5 mL, 0.5 mL, Intramuscular, During Hospitalization, Jorge Lyon MD  •  zolpidem (AMBIEN) tablet 5 mg, 5 mg, Oral, Nightly PRN, Jorge Lyon MD     Diagnostic Data    Lab Results (last 24 hours)     Procedure Component Value Units Date/Time    CBC Auto Differential [[id number]]  (Abnormal) Collected:  08/06/17 0613    Specimen:  Blood Updated:  08/06/17 0739     WBC 12.70 (H) 10*3/mm3      RBC 3.92 10*6/mm3      Hemoglobin 10.0 (L) g/dL      Hematocrit 31.3 (L) %      MCV 79.8 (L) fL      MCH 25.5 (L) pg      MCHC 31.9 g/dL      RDW 15.6 (H) %      RDW-SD 45.1 fl      MPV 12.7 (H) fL      Platelets 152 10*3/mm3      Neutrophil % 72.6 %      Lymphocyte % 19.0 %      Monocyte % 7.6 %      Eosinophil % 0.3 %      Basophil % 0.2 %      Immature Grans % 0.3 %      Neutrophils, Absolute 9.22 (H) 10*3/mm3      Lymphocytes, Absolute 2.41 10*3/mm3      Monocytes, Absolute 0.97 (H) 10*3/mm3      Eosinophils, Absolute 0.04 10*3/mm3      Basophils, Absolute 0.02 10*3/mm3      Immature Grans, Absolute 0.04 (H) 10*3/mm3     CBC & Differential [[id number]] Collected:  08/06/17 0613    Specimen:  Blood Updated:  08/06/17 0948    Narrative:       The following orders were created for panel order CBC & Differential.  Procedure                               Abnormality         Status                     ---------                               -----------         ------                     Scan Slide[[id number]]                                       Final result                CBC Auto Differential[[id number]]        Abnormal            Final result                 Please view results for these tests on the individual orders.    Scan Slide [[id number]] Collected:  17    Specimen:  Blood Updated:  17     Anisocytosis Slight/1+     Polychromasia --      FEW CELLS        WBC Morphology Normal     Platelet Estimate Adequate          I reviewed the patient's new clinical results.    Assessment/Plan:     Landen Guzmán 31 y.o.  PPD#2 s/p vaginal delivery.   1. Encourage ambulation  2. Continue routine postpartum care.  3. H/H : 10/31.3  Plan for disposition: Discharge home today if patient well PPD #2    Signature        This document has been electronically signed by Andrea Burns MD on 2017 5:11 AM

## 2017-08-07 NOTE — DISCHARGE SUMMARY
OBSTETRICS DISCHARGE SUMMARY    NAME: Landen Guzmán     ADMITTED: 2017  : 1985     DISCHARGED: 17  MRN: [id number]    ADMISSION DIAGNOSES:  1. Intrauterine pregnancy at 39wk2d GA  2. Labor DISCHARGE DIAGNOSES:  1. S/p spontaneous vaginal delivery     PROCEDURES:  Vaginal, Spontaneous Delivery   DELIVERING PHYSICIAN: Dr. Lyon    HISTORY AND HOSPITAL COURSE:    Patient is a 31 y.o. female  who presented at 39w2d GA in.  Estimated Date of Delivery: 8/10/17.  Please see H&P for full details.     She was admitted and progressed in labor with pitocin augmentation and epidural analgesia  to completely dilated.  She had a  of a viable male infant, 7lbs13.9oz, Apgars 8/9.  Patient had a second degree laceration that was repaired in layers with 2-0 vicryl.  No immediate complications were encountered.  Please see procedure note for full details.    Her postpartum course has been unremarkable.  Antepartem H/H was 12/37.2, postpartum H/H 10/31.3.  She had no signs or symptoms of acute blood loss anemia.  She was ambulating well, voiding without difficulty and lochia was within normal limits. She was breastfeeding well without difficulty.  She was stable for discharge on PPD#2.    DISCHARGE CONDITION: Stable    DISPOSITION:  Home or Self Care    DISCHARGE MEDICATIONS   Landen Guzmán   Home Medication Instructions CHARLY:[id number]    Printed on:17 0645   Medication Information                      ibuprofen (ADVIL,MOTRIN) 800 MG tablet  Take 1 tablet by mouth Every 6 (Six) Hours. Indications: Mild to Moderate Pain             Prenatal Vit-Fe Fumarate-FA (PRENATAL VITAMIN PO)  Take  by mouth.                 INSTRUCTIONS:  Activity:   Diet:   Special instructions: Precautions and instructions were discussed with her including but not limited to maintaining a regular diet at home, practicing local hygiene, pelvic rest and signs and symptoms to report including heavy vaginal  bleeding, frequent passage of clots, foul odor of lochia, increased pain, fever or any other concerns.    FOLLOW UP:   Additional Instructions for the Follow-ups that You Need to Schedule     Discharge Follow-up with Specified Provider    As directed    To:  Friday or Camron   Follow Up:  2 Weeks             Follow-up Information     Follow up with No Known Provider .    Contact information:    [facility] [id number]            Jorge Lyon MD is the attending at time of discharge, he is aware of the patient's status and agrees with the above discharge summary.    Signature      This document has been electronically signed by Andrea Burns MD on August 7, 2017 6:45 AM

## 2017-08-09 LAB — RPR SER QL: NORMAL

## 2017-08-28 ENCOUNTER — OFFICE VISIT (OUTPATIENT)
Dept: OBSTETRICS AND GYNECOLOGY | Facility: CLINIC | Age: 32
End: 2017-08-28

## 2017-08-28 VITALS
WEIGHT: 224 LBS | DIASTOLIC BLOOD PRESSURE: 68 MMHG | BODY MASS INDEX: 36 KG/M2 | SYSTOLIC BLOOD PRESSURE: 103 MMHG | HEIGHT: 66 IN

## 2017-08-28 PROCEDURE — 0503F POSTPARTUM CARE VISIT: CPT | Performed by: OBSTETRICS & GYNECOLOGY

## 2017-08-28 NOTE — PROGRESS NOTES
"Subjective   No chief complaint on file.     Landen Guzmán is a 31 y.o. year old  presenting to be seen for her postpartum visit.  She had a  on .  Patient states she is doing well.  No issues with urination or bowel movements.  Still spotting, but very light.  Thinking Paraguard for PPFP.  Denies feeling more sad or tearful than usual.  Baby doing well.  Bottle feeding.      Since delivery she has not been sexually active.  She does not have concerns about post-partum blues/depression.   She is bottle feeding.    The following portions of the patient's history were reviewed and updated as appropriate:current medications and allergies    Smoking status: Never Smoker                                                                 Smokeless status: Not on file                       Review of Systems - comprehensive ROS preformed and all negative.      Objective   /68  Ht 66\" (167.6 cm)  Wt 224 lb (102 kg)  BMI 36.15 kg/m2    General:  well developed; well nourished  no acute distress   Abdomen: soft, non-tender; no masses  no umbilical or inginual hernias are present  no hepato-splenomegaly   Pelvis: Not performed.          Assessment   1. Normal 2 week postpartum exam       Plan   1. Normal 2 week PP visit  - continue routine post partum restrictions  - No concerns for PP Blues; signs and symptoms of PP depression reviewed  - Thinking Paraguard for PPFP, will place at next visit.  - RTC in 4 weeks for full PP visit.      This note was electronically signed.    Becky Schwartz MD  2017  "

## 2019-06-14 ENCOUNTER — HOSPITAL ENCOUNTER (EMERGENCY)
Facility: HOSPITAL | Age: 34
Discharge: HOME | End: 2019-06-15
Attending: EMERGENCY MEDICINE | Admitting: EMERGENCY MEDICINE

## 2019-06-14 DIAGNOSIS — N39.0 ACUTE UTI: Primary | ICD-10-CM

## 2019-06-14 LAB
B-HCG UR QL: NEGATIVE
BACTERIA UR QL AUTO: ABNORMAL /HPF
BASOPHILS # BLD AUTO: 0.05 10*3/MM3 (ref 0–0.2)
BASOPHILS NFR BLD AUTO: 0.4 % (ref 0–1.5)
BILIRUB UR QL STRIP: NEGATIVE
CLARITY UR: CLEAR
COLOR UR: ABNORMAL
DEPRECATED RDW RBC AUTO: 40.8 FL (ref 37–54)
EOSINOPHIL # BLD AUTO: 0.09 10*3/MM3 (ref 0–0.4)
EOSINOPHIL NFR BLD AUTO: 0.8 % (ref 0.3–6.2)
ERYTHROCYTE [DISTWIDTH] IN BLOOD BY AUTOMATED COUNT: 13.1 % (ref 12.3–15.4)
GLUCOSE UR STRIP-MCNC: NEGATIVE MG/DL
HCT VFR BLD AUTO: 36.6 % (ref 34–46.6)
HGB BLD-MCNC: 12.2 G/DL (ref 12–15.9)
HGB UR QL STRIP.AUTO: ABNORMAL
HYALINE CASTS UR QL AUTO: ABNORMAL /LPF
IMM GRANULOCYTES # BLD AUTO: 0.04 10*3/MM3 (ref 0–0.05)
IMM GRANULOCYTES NFR BLD AUTO: 0.4 % (ref 0–0.5)
KETONES UR QL STRIP: NEGATIVE
LEUKOCYTE ESTERASE UR QL STRIP.AUTO: ABNORMAL
LIPASE SERPL-CCNC: 17 U/L (ref 13–60)
LYMPHOCYTES # BLD AUTO: 3.38 10*3/MM3 (ref 0.7–3.1)
LYMPHOCYTES NFR BLD AUTO: 29.6 % (ref 19.6–45.3)
MCH RBC QN AUTO: 28.3 PG (ref 26.6–33)
MCHC RBC AUTO-ENTMCNC: 33.3 G/DL (ref 31.5–35.7)
MCV RBC AUTO: 84.9 FL (ref 79–97)
MONOCYTES # BLD AUTO: 0.93 10*3/MM3 (ref 0.1–0.9)
MONOCYTES NFR BLD AUTO: 8.2 % (ref 5–12)
NEUTROPHILS # BLD AUTO: 6.92 10*3/MM3 (ref 1.7–7)
NEUTROPHILS NFR BLD AUTO: 60.6 % (ref 42.7–76)
NITRITE UR QL STRIP: POSITIVE
NRBC BLD AUTO-RTO: 0 /100 WBC (ref 0–0.2)
PH UR STRIP.AUTO: 6.5 [PH] (ref 5–9)
PLATELET # BLD AUTO: 295 10*3/MM3 (ref 140–450)
PMV BLD AUTO: 11.1 FL (ref 6–12)
PROT UR QL STRIP: NEGATIVE
RBC # BLD AUTO: 4.31 10*6/MM3 (ref 3.77–5.28)
RBC # UR: ABNORMAL /HPF
REF LAB TEST METHOD: ABNORMAL
SP GR UR STRIP: 1.02 (ref 1–1.03)
SQUAMOUS #/AREA URNS HPF: ABNORMAL /HPF
UROBILINOGEN UR QL STRIP: ABNORMAL
WBC NRBC COR # BLD: 11.41 10*3/MM3 (ref 3.4–10.8)
WBC UR QL AUTO: ABNORMAL /HPF

## 2019-06-14 PROCEDURE — 81001 URINALYSIS AUTO W/SCOPE: CPT

## 2019-06-14 PROCEDURE — 81025 URINE PREGNANCY TEST: CPT

## 2019-06-14 PROCEDURE — 99284 EMERGENCY DEPT VISIT MOD MDM: CPT

## 2019-06-14 PROCEDURE — 85025 COMPLETE CBC W/AUTO DIFF WBC: CPT

## 2019-06-14 PROCEDURE — 83690 ASSAY OF LIPASE: CPT | Performed by: EMERGENCY MEDICINE

## 2019-06-14 RX ORDER — SODIUM CHLORIDE 0.9 % (FLUSH) 0.9 %
10 SYRINGE (ML) INJECTION AS NEEDED
Status: DISCONTINUED | OUTPATIENT
Start: 2019-06-14 | End: 2019-06-15 | Stop reason: HOSPADM

## 2019-06-14 RX ORDER — ONDANSETRON 2 MG/ML
4 INJECTION INTRAMUSCULAR; INTRAVENOUS ONCE
Status: COMPLETED | OUTPATIENT
Start: 2019-06-14 | End: 2019-06-15

## 2019-06-15 ENCOUNTER — APPOINTMENT (OUTPATIENT)
Dept: CT IMAGING | Facility: HOSPITAL | Age: 34
End: 2019-06-15

## 2019-06-15 VITALS
OXYGEN SATURATION: 97 % | DIASTOLIC BLOOD PRESSURE: 69 MMHG | SYSTOLIC BLOOD PRESSURE: 114 MMHG | RESPIRATION RATE: 20 BRPM | BODY MASS INDEX: 36.16 KG/M2 | HEART RATE: 74 BPM | WEIGHT: 225 LBS | HEIGHT: 66 IN | TEMPERATURE: 97.7 F

## 2019-06-15 LAB
ALBUMIN SERPL-MCNC: 3.8 G/DL (ref 3.5–5.2)
ALBUMIN/GLOB SERPL: 1.4 G/DL
ALP SERPL-CCNC: 67 U/L (ref 39–117)
ALT SERPL W P-5'-P-CCNC: 13 U/L (ref 1–33)
ANION GAP SERPL CALCULATED.3IONS-SCNC: 13 MMOL/L
AST SERPL-CCNC: 17 U/L (ref 1–32)
BILIRUB SERPL-MCNC: <0.2 MG/DL (ref 0.2–1.2)
BUN BLD-MCNC: 15 MG/DL (ref 6–20)
BUN/CREAT SERPL: 25 (ref 7–25)
CALCIUM SPEC-SCNC: 8.9 MG/DL (ref 8.6–10.5)
CHLORIDE SERPL-SCNC: 105 MMOL/L (ref 98–107)
CO2 SERPL-SCNC: 24 MMOL/L (ref 22–29)
CREAT BLD-MCNC: 0.6 MG/DL (ref 0.57–1)
GFR SERPL CREATININE-BSD FRML MDRD: 117 ML/MIN/1.73
GLOBULIN UR ELPH-MCNC: 2.8 GM/DL
GLUCOSE BLD-MCNC: 99 MG/DL (ref 65–99)
HOLD SPECIMEN: NORMAL
HOLD SPECIMEN: NORMAL
POTASSIUM BLD-SCNC: 3.8 MMOL/L (ref 3.5–5.2)
PROT SERPL-MCNC: 6.6 G/DL (ref 6–8.5)
SODIUM BLD-SCNC: 142 MMOL/L (ref 136–145)
WHOLE BLOOD HOLD SPECIMEN: NORMAL
WHOLE BLOOD HOLD SPECIMEN: NORMAL

## 2019-06-15 PROCEDURE — 25010000002 ONDANSETRON PER 1 MG: Performed by: EMERGENCY MEDICINE

## 2019-06-15 PROCEDURE — 96375 TX/PRO/DX INJ NEW DRUG ADDON: CPT

## 2019-06-15 PROCEDURE — 25010000002 MORPHINE PER 10 MG: Performed by: EMERGENCY MEDICINE

## 2019-06-15 PROCEDURE — 25010000002 IOPAMIDOL 61 % SOLUTION: Performed by: EMERGENCY MEDICINE

## 2019-06-15 PROCEDURE — 25010000002 CEFTRIAXONE: Performed by: EMERGENCY MEDICINE

## 2019-06-15 PROCEDURE — 96365 THER/PROPH/DIAG IV INF INIT: CPT

## 2019-06-15 PROCEDURE — 80053 COMPREHEN METABOLIC PANEL: CPT | Performed by: EMERGENCY MEDICINE

## 2019-06-15 PROCEDURE — 74177 CT ABD & PELVIS W/CONTRAST: CPT

## 2019-06-15 RX ORDER — NAPROXEN 500 MG/1
500 TABLET ORAL 2 TIMES DAILY PRN
Qty: 10 TABLET | Refills: 0 | Status: SHIPPED | OUTPATIENT
Start: 2019-06-15 | End: 2022-06-13

## 2019-06-15 RX ORDER — PHENAZOPYRIDINE HYDROCHLORIDE 100 MG/1
100 TABLET, FILM COATED ORAL 3 TIMES DAILY PRN
Qty: 9 TABLET | Refills: 0 | Status: SHIPPED | OUTPATIENT
Start: 2019-06-15 | End: 2022-06-13

## 2019-06-15 RX ORDER — CEFDINIR 300 MG/1
300 CAPSULE ORAL 2 TIMES DAILY
Qty: 20 CAPSULE | Refills: 0 | Status: SHIPPED | OUTPATIENT
Start: 2019-06-15 | End: 2019-06-25

## 2019-06-15 RX ADMIN — SODIUM CHLORIDE, PRESERVATIVE FREE 10 ML: 5 INJECTION INTRAVENOUS at 01:04

## 2019-06-15 RX ADMIN — IOPAMIDOL 93 ML: 612 INJECTION, SOLUTION INTRAVENOUS at 02:06

## 2019-06-15 RX ADMIN — CEFTRIAXONE 1 G: 1 INJECTION, POWDER, FOR SOLUTION INTRAMUSCULAR; INTRAVENOUS at 00:37

## 2019-06-15 RX ADMIN — ONDANSETRON 4 MG: 2 INJECTION INTRAMUSCULAR; INTRAVENOUS at 00:36

## 2019-06-15 RX ADMIN — MORPHINE SULFATE 4 MG: 4 INJECTION, SOLUTION INTRAMUSCULAR; INTRAVENOUS at 00:36

## 2022-06-13 ENCOUNTER — OFFICE VISIT (OUTPATIENT)
Dept: FAMILY MEDICINE CLINIC | Facility: CLINIC | Age: 37
End: 2022-06-13

## 2022-06-13 VITALS
HEIGHT: 66 IN | BODY MASS INDEX: 38.28 KG/M2 | DIASTOLIC BLOOD PRESSURE: 78 MMHG | SYSTOLIC BLOOD PRESSURE: 118 MMHG | WEIGHT: 238.2 LBS

## 2022-06-13 DIAGNOSIS — E66.01 MORBID (SEVERE) OBESITY DUE TO EXCESS CALORIES: Chronic | ICD-10-CM

## 2022-06-13 DIAGNOSIS — R51.9 NONINTRACTABLE HEADACHE, UNSPECIFIED CHRONICITY PATTERN, UNSPECIFIED HEADACHE TYPE: Primary | ICD-10-CM

## 2022-06-13 DIAGNOSIS — R68.89 ABNORMAL FINDINGS ON EXAMINATION OF SKULL AND HEAD: ICD-10-CM

## 2022-06-13 PROCEDURE — 99213 OFFICE O/P EST LOW 20 MIN: CPT | Performed by: FAMILY MEDICINE

## 2022-06-13 RX ORDER — NORGESTIMATE AND ETHINYL ESTRADIOL 7DAYSX3 LO
1 KIT ORAL DAILY
COMMUNITY

## 2022-06-13 NOTE — PROGRESS NOTES
Subjective   Meshea Ruth Guzmán is a 36 y.o. female.  Requesting evaluation of dull headache.  Patient noted a tender knot on her frontal area about 6 months ago.  Continues to be tender to touch.  Is complaining of dull headache off and on for about 6 to 8 months.  Was started on birth control pill about 6 months ago.  Had been on Saxenda prior to that but stopped.  Takes no other regular medicines other than birth control.  No major surgery illnesses in the past.  Pregnancy history noted.  Does have a family history of colon cancer and pancreatic cancer in grandparents.  History noted.    History of Present Illness   HPI    The following portions of the patient's history were reviewed and updated as appropriate: allergies, current medications, past family history, past medical history, past social history, past surgical history and problem list.    Review of Systems  Review of Systems   Constitutional: Negative for activity change, appetite change, fatigue and unexpected weight change.   HENT: Negative for trouble swallowing and voice change.    Eyes: Negative for redness and visual disturbance.   Respiratory: Negative for cough and wheezing.    Cardiovascular: Negative for chest pain and palpitations.   Gastrointestinal: Negative for abdominal pain, constipation, diarrhea, nausea and vomiting.   Genitourinary: Negative for urgency.   Musculoskeletal: Negative for joint swelling.   Neurological: Positive for dizziness and headaches. Negative for syncope.   Hematological: Negative for adenopathy.   Psychiatric/Behavioral: Negative for sleep disturbance.       Objective   Physical Exam  Physical Exam  Constitutional:       Appearance: She is well-developed.   HENT:      Head: Normocephalic.        Comments: Frontal area to the right of midline shows a tender but firm what feels like small exostoses actually the frontal bone.  It is not mobile it is bone.  No surrounding erythema.     Right Ear: Tympanic membrane,  "ear canal and external ear normal.      Left Ear: Tympanic membrane, ear canal and external ear normal.      Nose: Nose normal.   Eyes:      Pupils: Pupils are equal, round, and reactive to light.   Neck:      Thyroid: No thyromegaly.      Comments: Normal range of motion.  Cardiovascular:      Rate and Rhythm: Normal rate and regular rhythm.      Heart sounds: Normal heart sounds. No murmur heard.    No friction rub. No gallop.   Pulmonary:      Breath sounds: Normal breath sounds.   Abdominal:      General: There is no distension.      Palpations: Abdomen is soft. There is no mass.      Tenderness: There is no abdominal tenderness.   Musculoskeletal:         General: Normal range of motion.      Cervical back: Normal range of motion.   Skin:     General: Skin is warm and dry.   Neurological:      Mental Status: She is alert and oriented to person, place, and time.      Cranial Nerves: Cranial nerves are intact.      Sensory: Sensation is intact.      Motor: Motor function is intact.      Coordination: Coordination is intact.      Deep Tendon Reflexes: Reflexes are normal and symmetric.   Psychiatric:         Attention and Perception: Attention normal.         Mood and Affect: Mood normal.         Speech: Speech normal.         Behavior: Behavior is cooperative.         Cognition and Memory: Cognition normal.           Visit Vitals  /78   Ht 167.6 cm (66\")   Wt 108 kg (238 lb 3.2 oz)   LMP 05/21/2022   BMI 38.45 kg/m²     Body mass index is 38.45 kg/m².      Assessment/Plan   Diagnoses and all orders for this visit:    1. Nonintractable headache, unspecified chronicity pattern, unspecified headache type (Primary)  -     CT Head Without Contrast; Future    2. Abnormal findings on examination of skull and head  -     CT Head Without Contrast; Future    3. Morbid (severe) obesity due to excess calories (HCC)    /78   Ht 167.6 cm (66\")   Wt 108 kg (238 lb 3.2 oz)   LMP 05/21/2022   BMI 38.45 kg/m² "   Obesity counseled on lifestyle measures diet and exercise briefly.  Given headache and abnormal findings we will go ahead and get a CT with bone windows to rule out any other exostoses internally.  Counseled on probable lack of findings in an possible pathophysiology.  We will follow-up after CT scan.

## 2022-06-20 ENCOUNTER — HOSPITAL ENCOUNTER (OUTPATIENT)
Dept: CT IMAGING | Facility: HOSPITAL | Age: 37
Discharge: HOME | End: 2022-06-20
Admitting: FAMILY MEDICINE

## 2022-06-20 DIAGNOSIS — R68.89 ABNORMAL FINDINGS ON EXAMINATION OF SKULL AND HEAD: ICD-10-CM

## 2022-06-20 DIAGNOSIS — R51.9 NONINTRACTABLE HEADACHE, UNSPECIFIED CHRONICITY PATTERN, UNSPECIFIED HEADACHE TYPE: ICD-10-CM

## 2022-06-20 PROCEDURE — 70450 CT HEAD/BRAIN W/O DYE: CPT

## 2022-08-15 PROCEDURE — 87635 SARS-COV-2 COVID-19 AMP PRB: CPT | Performed by: FAMILY MEDICINE

## 2024-01-01 NOTE — PROGRESS NOTES
CC: WILLIS visit    HPI:  No complaints or concerns.  +FM.  No LOF or vaginal bleeding.      PE - see vitals    A/P: 32 yo  @ 23w1d by LMP c/w 11w sujatha presents for WILLIS visit.   1. Continue routine prenatal care  - Continue PNV  - PTL precautions  - Rh negative, will need Rhogam at 28 weejs  - Declined flu vaccine and quad screen  - RTC in 4 weeks, will do 1hr and CBC at that time.       Becky MORALES Frimyra   
Term [place] Vaginal Delivery (>/= 37 weeks)